# Patient Record
Sex: FEMALE | Race: WHITE | HISPANIC OR LATINO | Employment: FULL TIME | URBAN - METROPOLITAN AREA
[De-identification: names, ages, dates, MRNs, and addresses within clinical notes are randomized per-mention and may not be internally consistent; named-entity substitution may affect disease eponyms.]

---

## 2017-03-03 ENCOUNTER — HOSPITAL ENCOUNTER (EMERGENCY)
Facility: HOSPITAL | Age: 57
Discharge: HOME | End: 2017-03-04
Attending: EMERGENCY MEDICINE
Payer: MEDICAID

## 2017-03-03 DIAGNOSIS — F41.1 ANXIETY REACTION: Primary | ICD-10-CM

## 2017-03-03 DIAGNOSIS — R00.2 PALPITATION: ICD-10-CM

## 2017-03-03 LAB
BASOPHILS # BLD AUTO: 0.04 K/UL
BASOPHILS NFR BLD: 0.7 %
DIFFERENTIAL METHOD: NORMAL
EOSINOPHIL # BLD AUTO: 0.1 K/UL
EOSINOPHIL NFR BLD: 1.5 %
ERYTHROCYTE [DISTWIDTH] IN BLOOD BY AUTOMATED COUNT: 13.5 %
HCT VFR BLD AUTO: 38.3 %
HGB BLD-MCNC: 12.3 G/DL
LYMPHOCYTES # BLD AUTO: 2 K/UL
LYMPHOCYTES NFR BLD: 32.7 %
MCH RBC QN AUTO: 29.6 PG
MCHC RBC AUTO-ENTMCNC: 32.1 %
MCV RBC AUTO: 92 FL
MONOCYTES # BLD AUTO: 0.3 K/UL
MONOCYTES NFR BLD: 5.4 %
NEUTROPHILS # BLD AUTO: 3.5 K/UL
NEUTROPHILS NFR BLD: 59.4 %
PLATELET # BLD AUTO: 215 K/UL
PMV BLD AUTO: 10.2 FL
RBC # BLD AUTO: 4.15 M/UL
WBC # BLD AUTO: 5.96 K/UL

## 2017-03-03 PROCEDURE — 93005 ELECTROCARDIOGRAM TRACING: CPT

## 2017-03-03 PROCEDURE — 80053 COMPREHEN METABOLIC PANEL: CPT

## 2017-03-03 PROCEDURE — 83880 ASSAY OF NATRIURETIC PEPTIDE: CPT

## 2017-03-03 PROCEDURE — 85025 COMPLETE CBC W/AUTO DIFF WBC: CPT

## 2017-03-03 PROCEDURE — 99284 EMERGENCY DEPT VISIT MOD MDM: CPT

## 2017-03-03 PROCEDURE — 85379 FIBRIN DEGRADATION QUANT: CPT

## 2017-03-03 PROCEDURE — 85610 PROTHROMBIN TIME: CPT

## 2017-03-03 PROCEDURE — 84484 ASSAY OF TROPONIN QUANT: CPT

## 2017-03-03 PROCEDURE — 25000003 PHARM REV CODE 250: Performed by: EMERGENCY MEDICINE

## 2017-03-03 RX ORDER — ALPRAZOLAM 0.25 MG/1
0.25 TABLET ORAL
Status: COMPLETED | OUTPATIENT
Start: 2017-03-03 | End: 2017-03-03

## 2017-03-03 RX ORDER — TRAMADOL HYDROCHLORIDE 50 MG/1
50 TABLET ORAL EVERY 6 HOURS PRN
COMMUNITY

## 2017-03-03 RX ADMIN — ALPRAZOLAM 0.25 MG: 0.25 TABLET ORAL at 11:03

## 2017-03-03 NOTE — ED AVS SNAPSHOT
[facility]  [address] [facility]  [place] [address]               Stacey Kirit   3/3/2017 10:20 PM   ED    Descripción:  Female : 1960   Departamento:  [facility]           Valladares Cuidado fue coordinado por:     Provider Role From To    So Lucas MD Attending Provider [phone] --      Razón de la nicholas     Palpitations           Diagnósticos de Esta Visita        Comentarios    Anxiety reaction    -  Primario     Palpitation           ED Disposition     Ninguna           Lista de tareas           Información de seguimiento     Realice un seguimiento con:  [facility] [place]    Cuándo:  3/6/2017    Información de contacto:    [address] [facility]  [address]  [place] [address]  [phone]        [facility]     [facility] En [facility] [place] de Enfermeras - Asistencia   Enfermeras registradas de [facility] pueden ayudarle a reservar gena nicholas, proveer educación para la brown, asesoría clínica, y otros servicios de asesoramiento.   Llame para maribell servicio gratuito a [phone].             Medicamentos           These medications were administered today        Dose Freq    alprazolam tablet 0.25 mg 0.25 mg ED 1 Time    Sig: Take 1 tablet (0.25 mg total) by mouth ED 1 Time.    Categoría: Normal    Vía: Oral      DEJAR de juan estos medicamentos     promethazine (PHENERGAN) 25 MG tablet Take 1 tablet (25 mg total) by mouth every 6 (six) hours as needed (nausea or dizziness).    amoxicillin (AMOXIL) 500 MG capsule Take 500 mg by mouth 3 (three) times daily.    ibuprofen (ADVIL,MOTRIN) 200 MG tablet Take 200 mg by mouth every 6 (six) hours as needed for Pain.    loratadine (CLARITIN) 10 mg tablet Take 10 mg by mouth once daily.           Verifique que la siguiente lista de medicamentos es gena representación exacta de los medicamentos que está tomando actualmente. Si no hay ningunos reportados, la lista puede  "estar en alvarez. Si no es correcta, por favor póngase en contacto con lim proveedor de atención médica. Lleve esta lista con usted en zack de emergencia.           Medicamentos Actuales     tramadol (ULTRAM) 50 mg tablet Take 50 mg by mouth every 6 (six) hours as needed for Pain.           Información de referencia clínica           Michelle signos vitales stevo     PS Pulso Temperatura Resp Charlotte Peso    147/87 (BP Location: Left arm, Patient Position: Lying, BP Method: Automatic) 76 97.8 °F (36.6 °C) (Oral) 19 5' 4" (1.626 m) 62.1 kg (137 lb)    SpO2 BMI (IMC)                99% 23.52 kg/m2          Alergias     A partir del:  3/4/2017        No Known Allergies      Vacunas     Administradas en la fecha de la visita:  3/4/2017        None      ED Micro, Lab, POCT     Start Ordered       Status Ordering Provider    03/03/17 2332 03/03/17 2331  D dimer, quantitative  STAT      Final result     03/03/17 2323 03/03/17 2322  CBC auto differential  STAT      Final result     03/03/17 2323 03/03/17 2322  Comprehensive metabolic panel  STAT      Final result     03/03/17 2323 03/03/17 2322  Protime-INR  STAT      Final result     03/03/17 2323 03/03/17 2322  Troponin I  Now then every 3 hours     Comments:  PLEASE REVIEW ORDER START TIME BEFORE MARKING SPECIMEN COLLECTED.   Start Status   03/03/17 2323 Final result   03/04/17 0223 Final result       Acknowledged     03/03/17 2323 03/03/17 2322  B-Type natriuretic peptide (BNP)  STAT      Final result       ED Imaging Orders     Start Ordered       Status Ordering Provider    03/03/17 2323 03/03/17 2322  X-Ray Chest PA And Lateral  1 time imaging      Final result         Instrucciones a ankit de lorna           ¿Qué son las palpitaciones cardíacas?    Las palpitaciones cardíacas son un síntoma. Es la sensación que tiene cuando lim corazón parece latir muy rápido, muy tyshawn, saltearse un latido o aletear. Las palpitaciones cardíacas suelen sentirse en el pecho. Iris también pueden " sentirse en el aaliyah.  ¿Cuáles son las causas de las palpitaciones cardíacas?  En la mayoría de los casos, las palpitaciones cardíacas se deben a:  · Estrés o ansiedad  · Actividad física  · Embarazo  · Algunos medicamentos  · Cafeína  · Nicotina  · Bebidas alcohólicas  · Drogas ilegales, man la cocaína  · Problemas de brown, tales man anemia o gena tiroides hiperactiva  En algunos casos, las palpitaciones cardíacas pueden deberse a un problema del corazón. Los ritmos cardíacos anormales (arritmias) son la principal preocupación. Quizás sea necesario un manejo adecuado de las palpitaciones por parte de usted y lim proveedor de atención médica. O también puede necesitar tratamiento de inmediato.  ¿Cómo se tratan las palpitaciones cardíacas?  El tratamiento para las palpitaciones cardíacas depende de lim causa. Las opciones pueden incluir:  · Manejar las cosas que le desencadenan las palpitaciones cardíacas. Por ejemplo:  · Aprender maneras de reducir el estrés y la ansiedad  · Evitar la cafeína, la nicotina, las bebidas alcohólicas o las drogas ilegales  · Dejar de usar ciertos medicamentos, siguiendo las indicaciones de lim médico  · Recurrir a medicamentos, procedimientos o cirugía para tratar gena arritmia u otro problema de brown que le esté causando los síntomas  ¿Cuáles son las complicaciones de las palpitaciones cardíacas?  Las complicaciones de las palpitaciones cardíacas son raras a menos que se deban a un problema reyes man la arritmia. En tales casos, las complicaciones pueden incluir:  · Desmayo  · Insuficiencia cardíaca. Mary problema se da cuando el corazón está tan débil que ya no bombea ramonita la lele.  · Coágulos sanguíneos y ataque cerebral  · Paro cardíaco repentino. Mary problema sucede cuando el corazón gaetano de latir de repente.  ¿Cuándo terry llamar a mi proveedor de atención médica?  Llame a lim proveedor de atención médica de inmediato si nota alguno de los siguientes síntomas:  · Fiebre de  100.4º F (38º C) o superior, o según le indiquen  · Los síntomas no mejoran con el tratamiento, o empeoran  · Síntomas nuevos, man dolor en el pecho, falta de aire, mareo o desmayo   Date Last Reviewed: 5/1/2016  © 5591-7960 [employer]. [address] [place] [address], [place] [address]. All rights reserved. This information is not intended as a substitute for professional medical care. Always follow your healthcare professional's instructions.        Los trastornos de ansiedad  Marsha todo el anahi siente nerviosismo de vez en cuando. Es normal tener un nudo en el estómago antes de un examen, o el pulso acelerado la primera vez que ethan sale con alguien. Iris un trastorno de ansiedad es mucho más que un ataque de nervios; de hecho, kirstie síntomas pueden resultar abrumadores. Afortunadamente, muchos de estos síntomas pueden aliviarse con un tratamiento; empiece por hablar con lim médico.    ¿Qué son los trastornos de ansiedad?  Las personas con trastornos de ansiedad sienten pánico y miedo intensos. Estos sentimientos pueden surgir sin motivo aparente y tienden a recurrir gena y otra vez, al punto de interferir en la dorothy cotidiana y causar gran angustia. Lim temor podría inducirle a evitar cualquier factor que lo desencadena, en casos extremos, reyes vez usted deje de salir de lim casa. Los trastornos de ansiedad pueden producir otros síntomas, entre ellos:  · Pensamientos obsesivos que no se pueden controlar  · Pesadillas constantes o recuerdos dolorosos del pasado  · Náuseas, transpiración y tensión muscular  · Dificultad para dormir o para concentrarse  ¿Qué causa los trastornos de ansiedad?  Los trastornos de ansiedad tienden a afectar a varios miembros de la misma seema; en algunas personas podrían deberse a maltrato o negligencia en la infancia, mientras que en otras a eventos estresantes o traumáticos en lim dorothy. La ansiedad puede deteriorar la autoestima y las habilidades para enfrentarse a situaciones.  Para  mejorar  Raffy vez usted piense que nada puede ayudarlo o sandra lo que dirán los demás. Iris muchos de los síntomas de ansiedad pueden aliviarse. No hay por qué avergonzarse de tener un trastorno de ansiedad; la mayoría de las personas obtienen los mejores resultados con un tratamiento que combina medicamentos y terapia. Aunque no es gena lico, maribell tipo de tratamiento puede ayudarle a tener gena dorothy más berenice.  Trastornos de ansiedad frecuentes  · Trastorno de pánico. Causa un miedo intenso de encontrarse en peligro.  · Fobias. Miedos extremos a ciertos objetos, lugares o eventos.  · Trastorno obsesivo-compulsivo. Causa pensamientos indeseables y a veces, la necesidad de realizar ciertas acciones gena y otra vez.  · Trastorno por estrés postraumático. Sucede en personas que fagan sobrevivido experiencias terribles; puede causar pesadillas y flashbacks sobre el evento.  · Trastorno generalizado de ansiedad. Causa preocupaciones constantes que pueden interferir seriamente en la dorothy.   Date Last Reviewed: 2/11/2015 © 2000-2016 [employer]. [address] [place] [address]. Todos los derechos reservados. Esta información no pretende sustituir la atención médica profesional. Sólo lim médico puede diagnosticar y tratar un problema de brown.          Reacción Ante El Estrés [Stress Reaction]  La ansiedad (anxiety) es gena sensación que todos tenemos cuando creemos que algo teresa puede llegar a pasar. Es gena respuesta normal ante el estrés y, por lo general, sólo causa gena reacción leve. Cuando la ansiedad se vuelve más severa, es posible que las emociones interfieran con la dorothy diaria. En algunos casos, usted quizás no se dé cuenta de qué es lo que le provoca la ansiedad.  Gabby gena reacción de ansiedad, puede sentirse desesperanzado, nervioso, deprimido o irritable. Lim cuerpo puede mostrar signos de ansiedad de muchas maneras diferentes. Puede sentir la boca seca, temblores, mareos, debilidad, dificultad  para respirar, presión en el pecho, dolor de chelsi, náuseas, diarrea, cansancio, problemas sexuales o para dormir.  Cuidados En [place]:  1) Intente identificar qué cosas de lim dorothy le ocasionan estrés (stress). Quizás no senait obvias. Pueden ser, por ejemplo:  -- Complicaciones diarias que se van acumulando (embotellamientos, citas a las que no pudo asistir, problemas con el automóvil, etc.).  -- Cambios de dorothy importantes, tanto buenos (la llegada de un bebé, un ascenso en el trabajo) man malos (quedarse sin trabajo, perder a un ser querido).  -- Sobrecarga: la sensación de que usted tiene demasiadas responsabilidades y que no puede ocuparse de todas ellas al mismo tiempo.  -- Sentirse abrumado, la sensación de que kirstie problemas son mucho más de lo que usted puede llegar a resolver.  2) Observe cómo responde lim cuerpo al estrés. Aprenda a oír las señales que le da lim cuerpo. Ello puede ayudarle a actuar antes de que el estrés se vuelva grave.  3) Cuando le sea posible, intente hacer algo para eliminar la causa que le origina estrés. (Evite las complicaciones, limite la cantidad de cambios que suceden en lim dorothy al mismo tiempo y tómese un descanso cuando se sienta abrumado.)  4) Desafortunadamente, hay muchas situaciones estresantes que no se pueden evitar. Es necesario que aprenda a MANEJAR MEJOR EL ESTRÉS. Hay varios métodos comprobados que le permitirán reducir la ansiedad. Por ejemplo, cosas simples man hacer ejercicio, tener gena buena nutrición y el descanso adecuado. Asimismo, hay ciertas técnicas que resultan útiles: ejercicios de relajación y respiración, visualización, biofeedback y meditación. Para obtener más información sobre ello, consulte a lim médico o vaya a la librería de lim kalyan y sha los diferentes libros y cintas que hay disponibles sobre maribell sandra.  Visita De Control:  Si claudette que no logra reducir lim ansiedad con medidas de autoayuda, comuníquese con lim médico o programe gena nicholas con un  consejero.  Busque Prontamente Atención Médica  si algo de lo siguiente ocurre:  -- Los síntomas empeoran.  -- Dolor en el pecho o dificultades para respirar.  -- Dolor de chelsi tyshawn que no se massiel descansando y tomando algún calmante suave.  -- El corazón late rápido o de manera irregular. Desmayo.  Date Last Reviewed: 9/29/2015  © 7660-3471 [employer]. [address] [place] [address]. Todos los derechos reservados. Esta información no pretende sustituir la atención médica profesional. Sólo lim médico puede diagnosticar y tratar un problema de brown.          Registrarse para [facility]     La activación de lim cuenta [place] es tan fácil man 1-2-3!    1) Ir a my.[facility].[place], seleccione Registrarse Ahora, meter el código de activación y lim fecha de nacimiento, y seleccione Próximo.    [id number]  Expires: 4/18/2017  3:01 AM      2) Crear un nombre de usuario y contraseña para usar cuando se visita [facility] en el futuro y selecciona gena pregunta de seguridad en zack de que pierda lim contraseña y seleccione Próximo.    3) Introduzca lim dirección de correo electrónico y faiza ic en Registrarse!    Información Adicional  Si tiene alguna pregunta, por favor, e-mail [email or web address] o llame al [phone] para hablar con nuestro personal. Recuerde, [facility] no debe ser usada para necesidades urgentes. En zack de emergencia médica, llame al [phone].         [facility] cumple con las leyes federales aplicables de derechos civiles y no discrimina por motivos de ulysses, color, origen nacional, edad, discapacidad, o sexo.        Language Assistance Services     ATTENTION: Language assistance services are available, free of charge. Please call [phone].      ATENCIÓN: Si habla español, tiene a lim disposición servicios gratuitos de asistencia lingüística. Llame al [phone].     PORTIA Ý: N?u b?n nói Ti?ng Vi?t, có các d?ch v? h? tr? ngôn ng? mi?n phí darell cho  b?n. G?i s? [phone].                      [facility]  [address] [facility] [address] [facility]  [place] [address]               Stacey Kirit   3/3/2017 10:20 PM   ED    Description:  Female : 1960   Department:  [facility]           Your Care was Coordinated By:     Provider Role From To    So Lucas MD Attending Provider [phone] --      Reason for Visit     Palpitations           Diagnoses this Visit        Comments    Anxiety reaction    -  Primary     Palpitation           ED Disposition     None           To Do List           Follow-up Information     Follow up with [facility] [place] In 2 days.    Contact information:    [address] [facility]  [address]  [place] [address]  [phone]        [facility]     [facility] Care [facility] -  Assistance  Registered nurses in the [facility] provide clinical advisement, health education, appointment booking, and other advisory services.  Call for this free service at [phone].             Medications           These medications were administered today        Dose Freq    alprazolam tablet 0.25 mg 0.25 mg ED 1 Time    Sig: Take 1 tablet (0.25 mg total) by mouth ED 1 Time.    Class: Normal    Route: Oral      STOP taking these medications     promethazine (PHENERGAN) 25 MG tablet Take 1 tablet (25 mg total) by mouth every 6 (six) hours as needed (nausea or dizziness).    amoxicillin (AMOXIL) 500 MG capsule Take 500 mg by mouth 3 (three) times daily.    ibuprofen (ADVIL,MOTRIN) 200 MG tablet Take 200 mg by mouth every 6 (six) hours as needed for Pain.    loratadine (CLARITIN) 10 mg tablet Take 10 mg by mouth once daily.           Verify that the below list of medications is an accurate representation of the medications you are currently taking.  If none reported, the list may be blank. If incorrect, please contact your healthcare provider. Carry this list  "with you in case of emergency.           Current Medications     tramadol (ULTRAM) 50 mg tablet Take 50 mg by mouth every 6 (six) hours as needed for Pain.           Clinical Reference Information           Your Vitals Were     BP Pulse Temp Resp Height Weight    147/87 (BP Location: Left arm, Patient Position: Lying, BP Method: Automatic) 76 97.8 °F (36.6 °C) (Oral) 19 5' 4" (1.626 m) 62.1 kg (137 lb)    SpO2 BMI                99% 23.52 kg/m2          Allergies as of 3/4/2017     No Known Allergies      Immunizations Administered on Date of Encounter - 3/4/2017     None      ED Micro, Lab, POCT     Start Ordered       Status Ordering Provider    03/03/17 2332 03/03/17 2331  D dimer, quantitative  STAT      Final result     03/03/17 2323 03/03/17 2322  CBC auto differential  STAT      Final result     03/03/17 2323 03/03/17 2322  Comprehensive metabolic panel  STAT      Final result     03/03/17 2323 03/03/17 2322  Protime-INR  STAT      Final result     03/03/17 2323 03/03/17 2322  Troponin I  Now then every 3 hours     Comments:  PLEASE REVIEW ORDER START TIME BEFORE MARKING SPECIMEN COLLECTED.   Start Status   03/03/17 2323 Final result   03/04/17 0223 Final result       Acknowledged     03/03/17 2323 03/03/17 2322  B-Type natriuretic peptide (BNP)  STAT      Final result       ED Imaging Orders     Start Ordered       Status Ordering Provider    03/03/17 2323 03/03/17 2322  X-Ray Chest PA And Lateral  1 time imaging      Final result         Discharge Instructions           ¿Qué son las palpitaciones cardíacas?    Las palpitaciones cardíacas son un síntoma. Es la sensación que tiene cuando lim corazón parece latir muy rápido, muy tyshawn, saltearse un latido o aletear. Las palpitaciones cardíacas suelen sentirse en el pecho. Iris también pueden sentirse en el aaliyah.  ¿Cuáles son las causas de las palpitaciones cardíacas?  En la mayoría de los casos, las palpitaciones cardíacas se deben a:  · Estrés o " ansiedad  · Actividad física  · Embarazo  · Algunos medicamentos  · Cafeína  · Nicotina  · Bebidas alcohólicas  · Drogas ilegales, man la cocaína  · Problemas de brown, tales man anemia o gena tiroides hiperactiva  En algunos casos, las palpitaciones cardíacas pueden deberse a un problema del corazón. Los ritmos cardíacos anormales (arritmias) son la principal preocupación. Quizás sea necesario un manejo adecuado de las palpitaciones por parte de usted y lim proveedor de atención médica. O también puede necesitar tratamiento de inmediato.  ¿Cómo se tratan las palpitaciones cardíacas?  El tratamiento para las palpitaciones cardíacas depende de lim causa. Las opciones pueden incluir:  · Manejar las cosas que le desencadenan las palpitaciones cardíacas. Por ejemplo:  · Aprender maneras de reducir el estrés y la ansiedad  · Evitar la cafeína, la nicotina, las bebidas alcohólicas o las drogas ilegales  · Dejar de usar ciertos medicamentos, siguiendo las indicaciones de lim médico  · Recurrir a medicamentos, procedimientos o cirugía para tratar gena arritmia u otro problema de brown que le esté causando los síntomas  ¿Cuáles son las complicaciones de las palpitaciones cardíacas?  Las complicaciones de las palpitaciones cardíacas son raras a menos que se deban a un problema reyes man la arritmia. En tales casos, las complicaciones pueden incluir:  · Desmayo  · Insuficiencia cardíaca. Mary problema se da cuando el corazón está tan débil que ya no bombea ramonita la lele.  · Coágulos sanguíneos y ataque cerebral  · Paro cardíaco repentino. Mary problema sucede cuando el corazón gaetano de latir de repente.  ¿Cuándo terry llamar a mi proveedor de atención médica?  Llame a lim proveedor de atención médica de inmediato si nota alguno de los siguientes síntomas:  · Fiebre de 100.4º F (38º C) o superior, o según le indiquen  · Los síntomas no mejoran con el tratamiento, o empeoran  · Síntomas nuevos, man dolor en el pecho, falta de aire,  divya hanson   Date Last Reviewed: 5/1/2016  © 1213-2447 [employer]. [address] [place] [address], [place] [address]. All rights reserved. This information is not intended as a substitute for professional medical care. Always follow your healthcare professional's instructions.        Los trastornos de ansiedad  Marsha todo el anahi siente nerviosismo de vez en cuando. Es normal tener un nudo en el estómago antes de un examen, o el pulso acelerado la primera vez que ethan sale con alguien. Iris un trastorno de ansiedad es mucho más que un ataque de nervios; de hecho, kirstie síntomas pueden resultar abrumadores. Afortunadamente, muchos de estos síntomas pueden aliviarse con un tratamiento; empiece por hablar con lim médico.    ¿Qué son los trastornos de ansiedad?  Las personas con trastornos de ansiedad sienten pánico y miedo intensos. Estos sentimientos pueden surgir sin motivo aparente y tienden a recurrir gena y otra vez, al punto de interferir en la dorothy cotidiana y causar gran angustia. Lim temor podría inducirle a evitar cualquier factor que lo desencadena, en casos extremos, reyes vez usted deje de salir de lim casa. Los trastornos de ansiedad pueden producir otros síntomas, entre ellos:  · Pensamientos obsesivos que no se pueden controlar  · Pesadillas constantes o recuerdos dolorosos del pasado  · Náuseas, transpiración y tensión muscular  · Dificultad para dormir o para concentrarse  ¿Qué causa los trastornos de ansiedad?  Los trastornos de ansiedad tienden a afectar a varios miembros de la misma seema; en algunas personas podrían deberse a maltrato o negligencia en la infancia, mientras que en otras a eventos estresantes o traumáticos en lim dorothy. La ansiedad puede deteriorar la autoestima y las habilidades para enfrentarse a situaciones.  Para mejorar  Reyes vez usted piense que nada puede ayudarlo o sandra lo que dirán los demás. Iris muchos de los síntomas de ansiedad pueden aliviarse. No hay por qué  avergonzarse de tener un trastorno de ansiedad; la mayoría de las personas obtienen los mejores resultados con un tratamiento que combina medicamentos y terapia. Aunque no es gena lico, maribell tipo de tratamiento puede ayudarle a tener gena dorothy más berenice.  Trastornos de ansiedad frecuentes  · Trastorno de pánico. Causa un miedo intenso de encontrarse en peligro.  · Fobias. Miedos extremos a ciertos objetos, lugares o eventos.  · Trastorno obsesivo-compulsivo. Causa pensamientos indeseables y a veces, la necesidad de realizar ciertas acciones gena y otra vez.  · Trastorno por estrés postraumático. Sucede en personas que fagan sobrevivido experiencias terribles; puede causar pesadillas y flashbacks sobre el evento.  · Trastorno generalizado de ansiedad. Causa preocupaciones constantes que pueden interferir seriamente en la dorothy.   Date Last Reviewed: 2/11/2015 © 2000-2016 [employer]. [address] [place] [address]. Todos los derechos reservados. Esta información no pretende sustituir la atención médica profesional. Sólo lim médico puede diagnosticar y tratar un problema de brown.          Reacción Ante El Estrés [Stress Reaction]  La ansiedad (anxiety) es gena sensación que todos tenemos cuando creemos que algo teresa puede llegar a pasar. Es gena respuesta normal ante el estrés y, por lo general, sólo causa gena reacción leve. Cuando la ansiedad se vuelve más severa, es posible que las emociones interfieran con la dorothy diaria. En algunos casos, usted quizás no se dé cuenta de qué es lo que le provoca la ansiedad.  Gabby gena reacción de ansiedad, puede sentirse desesperanzado, nervioso, deprimido o irritable. Lim cuerpo puede mostrar signos de ansiedad de muchas maneras diferentes. Puede sentir la boca seca, temblores, mareos, debilidad, dificultad para respirar, presión en el pecho, dolor de chelsi, náuseas, diarrea, cansancio, problemas sexuales o para dormir.  Cuidados En [place]:  1) Intente  identificar qué cosas de lim dorothy le ocasionan estrés (stress). Quizás no senait obvias. Pueden ser, por ejemplo:  -- Complicaciones diarias que se van acumulando (embotellamientos, citas a las que no pudo asistir, problemas con el automóvil, etc.).  -- Cambios de dorothy importantes, tanto buenos (la llegada de un bebé, un ascenso en el trabajo) man malos (quedarse sin trabajo, perder a un ser querido).  -- Sobrecarga: la sensación de que usted tiene demasiadas responsabilidades y que no puede ocuparse de todas ellas al mismo tiempo.  -- Sentirse abrumado, la sensación de que kirstie problemas son mucho más de lo que usted puede llegar a resolver.  2) Observe cómo responde lim cuerpo al estrés. Aprenda a oír las señales que le da lim cuerpo. Ello puede ayudarle a actuar antes de que el estrés se vuelva grave.  3) Cuando le sea posible, intente hacer algo para eliminar la causa que le origina estrés. (Evite las complicaciones, limite la cantidad de cambios que suceden en lim dorothy al mismo tiempo y tómese un descanso cuando se sienta abrumado.)  4) Desafortunadamente, hay muchas situaciones estresantes que no se pueden evitar. Es necesario que aprenda a MANEJAR MEJOR EL ESTRÉS. Hay varios métodos comprobados que le permitirán reducir la ansiedad. Por ejemplo, cosas simples man hacer ejercicio, tener gena buena nutrición y el descanso adecuado. Asimismo, hay ciertas técnicas que resultan útiles: ejercicios de relajación y respiración, visualización, biofeedback y meditación. Para obtener más información sobre ello, consulte a lim médico o vaya a la librería de lim kalyan y sha los diferentes libros y cintas que hay disponibles sobre maribell sandra.  Visita De Control:  Si claudette que no logra reducir lim ansiedad con medidas de autoayuda, comuníquese con lim médico o programe gena nicholas con un consejero.  Busque Prontamente Atención Médica  si algo de lo siguiente ocurre:  -- Los síntomas empeoran.  -- Dolor en el pecho o dificultades para  respirar.  -- Dolor de chelsi tyshawn que no se massiel descansando y tomando algún calmante suave.  -- El corazón late rápido o de manera irregular. Mikeo.  Date Last Reviewed: 9/29/2015  © 9921-8732 [employer]. [address] [place] [address], [place] [address]. Todos los derechos reservados. Esta información no pretende sustituir la atención médica profesional. Sólo lim médico puede diagnosticar y tratar un problema de brown.          [facility] Sign-Up     Activating your [facility] account is as easy as 1-2-3!     1) Visit [email or web address], select Sign Up Now, enter this activation code and your date of birth, then select Next.  [email or web address]  Expires: 4/18/2017  3:01 AM      2) Create a username and password to use when you visit [facility] in the future and select a security question in case you lose your password and select Next.    3) Enter your e-mail address and click Sign Up!    Additional Information  If you have questions, please e-mail [email or web address] or call [phone] to talk to our [facility] staff. Remember, [facility] is NOT to be used for urgent needs. For medical emergencies, dial 911.          [facility] complies with applicable Federal civil rights laws and does not discriminate on the basis of race, color, national origin, age, disability, or sex.        Language Assistance Services     ATTENTION: Language assistance services are available, free of charge. Please call [phone].      ATENCIÓN: Si habla español, tiene a lim disposición servicios gratuitos de asistencia lingüística. Llame al [phone].     CHÚ Ý: N?u b?n nói Ti?ng Vi?t, có các d?ch v? h? tr? ngôn ng? mi?n phí dành cho b?n. G?i s? [phone].

## 2017-03-04 VITALS
HEIGHT: 64 IN | BODY MASS INDEX: 23.39 KG/M2 | DIASTOLIC BLOOD PRESSURE: 90 MMHG | WEIGHT: 137 LBS | RESPIRATION RATE: 19 BRPM | SYSTOLIC BLOOD PRESSURE: 148 MMHG | HEART RATE: 79 BPM | TEMPERATURE: 98 F | OXYGEN SATURATION: 98 %

## 2017-03-04 LAB
ALBUMIN SERPL BCP-MCNC: 4.6 G/DL
ALP SERPL-CCNC: 196 U/L
ALT SERPL W/O P-5'-P-CCNC: 118 U/L
ANION GAP SERPL CALC-SCNC: 15 MMOL/L
AST SERPL-CCNC: 58 U/L
BILIRUB SERPL-MCNC: 0.3 MG/DL
BNP SERPL-MCNC: 10 PG/ML
BUN SERPL-MCNC: 15 MG/DL
CALCIUM SERPL-MCNC: 9.9 MG/DL
CHLORIDE SERPL-SCNC: 104 MMOL/L
CO2 SERPL-SCNC: 22 MMOL/L
CREAT SERPL-MCNC: 0.8 MG/DL
D DIMER PPP IA.FEU-MCNC: 0.27 MG/L FEU
EST. GFR  (AFRICAN AMERICAN): >60 ML/MIN/1.73 M^2
EST. GFR  (NON AFRICAN AMERICAN): >60 ML/MIN/1.73 M^2
GLUCOSE SERPL-MCNC: 134 MG/DL
INR PPP: 1
POTASSIUM SERPL-SCNC: 3.7 MMOL/L
PROT SERPL-MCNC: 8.7 G/DL
PROTHROMBIN TIME: 10.6 SEC
SODIUM SERPL-SCNC: 141 MMOL/L
TROPONIN I SERPL DL<=0.01 NG/ML-MCNC: <0.006 NG/ML
TROPONIN I SERPL DL<=0.01 NG/ML-MCNC: <0.006 NG/ML

## 2017-03-04 PROCEDURE — 84484 ASSAY OF TROPONIN QUANT: CPT

## 2017-03-04 NOTE — ED PROVIDER NOTES
"Encounter Date: 3/3/2017       History     Chief Complaint   Patient presents with    Palpitations     palpitations and shortness of breath.  Patient became nervous after son and father got in an argument.     Review of patient's allergies indicates:  No Known Allergies  HPI Comments: 57 Y/O HF PRESENTS WITH C/O PALPITATIONS AND SOB APPROXIMATELY 1 HOUR PTA.  THE SYMPTOMS BEGAN WHEN THE PATIENT WITNESSED AN ARGUMENT BETWEEN TWO FAMILY MEMBERS AND "THERE WAS A LOT OF SCREAMING."  PT STATES THAT SHE HAS A PROBLEM WITH ANXIETY AND HAS HAD INCREASED AMOUNT OF NERVOUSNESS LATELY DUE TO STRESS.  PT DENIES ANY HX OF CARDIAC PROBLEMS IN THE PAST.  PT CURRENTLY DENIES CP OR SOB.  SHE REPORTS THAT SHE FEELS LIKE HER HEART IS BEATING FAST.  NO HX OF ARRHYTHMIA IN THE PAST.  NO FEVER/CHILLS.  NO COUGH OR WHEEZING.  NO N/V/ABD PAIN OR DIARRHEA.  NO RECENT ILLNESS.      PT REPORTS PMHX OF HLD.  NO DX OF HTN OR DM.  PT IS A NON-SMOKER.  PT MOTHER HAD AN MI AT THE AGE OF 61.  PT HAS NEVER HAD A STRESS TEST.  SHE IS FOLLOWED BY [facility] AND HER NEXT APPT IS IN April.      The history is provided by the patient and a relative. A  was used.     Past Medical History:   Diagnosis Date    Arthritis     Hyperlipidemia      History reviewed. No pertinent surgical history.  History reviewed. No pertinent family history.  Social History   Substance Use Topics    Smoking status: Never Smoker    Smokeless tobacco: Never Used    Alcohol use No     Review of Systems   Constitutional: Negative for chills, diaphoresis and fever.   HENT: Negative for congestion.    Eyes: Negative.    Respiratory: Positive for shortness of breath. Negative for cough, chest tightness and wheezing.    Cardiovascular: Positive for palpitations. Negative for chest pain and leg swelling.   Gastrointestinal: Negative for abdominal pain, diarrhea, nausea and vomiting.   Endocrine: Negative for polydipsia and polyphagia. "   Genitourinary: Negative for difficulty urinating and dysuria.   Musculoskeletal: Negative for back pain and neck pain.   Skin: Negative for pallor and rash.   Allergic/Immunologic: Negative for immunocompromised state.   Neurological: Negative for dizziness and headaches.   Hematological: Does not bruise/bleed easily.   Psychiatric/Behavioral: Negative for agitation and confusion. The patient is nervous/anxious.    All other systems reviewed and are negative.      Physical Exam   Initial Vitals   BP Pulse Resp Temp SpO2   03/03/17 2210 03/03/17 2210 03/03/17 2210 03/03/17 2210 03/03/17 2210   184/96 104 18 97.8 °F (36.6 °C) 98 %     Physical Exam    Nursing note and vitals reviewed.  Constitutional: She appears well-developed and well-nourished. She is not diaphoretic. No distress.   57 Y/O HF, ANXIOUS AND TALKING WITH FAMILY AT BEDSIDE.  NO DISTRESS.     HENT:   Head: Normocephalic and atraumatic.   Mouth/Throat: Oropharynx is clear and moist.   Eyes: Conjunctivae and EOM are normal. No scleral icterus.   Neck: Normal range of motion. Neck supple.   Cardiovascular: Normal rate, regular rhythm and normal heart sounds. Exam reveals no gallop and no friction rub.    No murmur heard.  Pulmonary/Chest: Breath sounds normal. No respiratory distress. She has no wheezes. She has no rhonchi. She has no rales. She exhibits no tenderness.   Abdominal: Soft. Bowel sounds are normal. She exhibits no distension. There is no tenderness. There is no rebound and no guarding.   Musculoskeletal: Normal range of motion. She exhibits no edema.   Lymphadenopathy:     She has no cervical adenopathy.   Neurological: She is alert and oriented to person, place, and time.   Skin: Skin is warm and dry. No erythema.   Psychiatric: Judgment and thought content normal.   ANXIOUS         ED Course   Procedures  Labs Reviewed - No data to display  EKG Readings: (Independently Interpreted)   Initial Reading: No STEMI. Previous EKG: Compared with  most recent EKG Previous EKG Date: 11/02/14. Heart Rate: 93.       X-Rays:   Independently Interpreted Readings:   Chest X-Ray: Normal heart size.  No infiltrates.  No acute abnormalities.     Medical Decision Making:   Initial Assessment:   55 Y/O HF PRESENTS WITH SOB AND PALPITATIONS THAT BEGAN PTA WHEN SHE WITNESSED AN ARGUMENT BETWEEN FAMILY MEMBERS.  NO CHEST PAIN OR PRESSURE.  PT REPORTS HER SYMPTOMS ARE IMPROVING.  NO SOB CURRENTLY.  NO HX OF HEART DISEASE.    Differential Diagnosis:   DDX: ACS, STEMI, ANXIETY REACTION, METABOLIC DERANGEMENT, ARRHYTHMIA  Independently Interpreted Test(s):   I have ordered and independently interpreted X-rays - see prior notes.  I have ordered and independently interpreted EKG Reading(s) - see prior notes  Clinical Tests:   Lab Tests: Ordered and Reviewed  The following lab test(s) were unremarkable: CBC, CMP, Troponin, D-Dimer and BNP  Radiological Study: Ordered and Reviewed  Medical Tests: Ordered and Reviewed  ED Management:   PT REPORTS FEELING MUCH BETTER AFTER XANAX.  SHE HAS NOT HAD ANY REPEAT EPISODES OF PALPITATIONS OR SOB AND REPORTS SHE FEELS CALM.    PT WORK UP IN ED IS NEG.  SHE IS WELL APPEARING AND WANTS TO GO HOME. I FEEL HER SYMPTOMS ARE MORE CONSISTENT WITH ANXIETY REACTION THAN CARDIAC IN NATURE.  I HAVE DISCUSSED THE NEED FOR A BASELINE STRESS TEST WITH THE PATIENT AND HER DAUGHTER.  THEY BOTH UNDERSTAND AND WILL HAVE HER F/U WITH PCP ON Monday TO SCHEDULE STRESS TEST.  PT AND HER DAUGHTER ALSO UNDERSTAND THAT THE PATIENT MUST RETURN TO ED WITH ANY WORSENING OF CONDITION.      Pt counseled on their diagnosis and the importance of following up with PCP.  Pt also cautioned on when to return to ED.  Pt verbalizes understanding of discharge plan and will return to ED immediately if symptoms worsen                     ED Course     Clinical Impression:   The primary encounter diagnosis was Anxiety reaction. A diagnosis of Palpitation was also pertinent to this  visit.    Disposition:   Disposition: Discharged  Condition: Stable       So Lucas MD  03/04/17 0537

## 2017-03-04 NOTE — ED NOTES
Pt. appears calm, no longer tearful or tremors, denies palpitations. Denies dizziness or lightheadedness. Denies CP or chest tightness at present time. Denies SOB. AAox4. NAD.

## 2017-03-04 NOTE — ED NOTES
"Pt. ambulatory to Ed room with steady gait.  at bedside for translation. Daughter and grandson at bedside. Pt. appears anxious, tearful, bilateral hand tremors, reports "heart racing" and chest tightness. "felt like she was going to pass out". Palpitations. Onset of symptoms began after argument at home between spouse and son. Pt states she does feels safe to go home. Denies SOB. Denies BUE numbness or tingling. AAOx4.  "

## 2017-03-04 NOTE — ED NOTES
at bedside for translation. Explained medication indications, contraindications, AE, SE, reactions and expected outcomes. Pt verbalizes understanding. Agrees to treatment plan. Denies allergy.

## 2017-03-04 NOTE — DISCHARGE INSTRUCTIONS
¿Qué son las palpitaciones cardíacas?    Las palpitaciones cardíacas son un síntoma. Es la sensación que tiene cuando lim corazón parece latir muy rápido, muy tyshawn, saltearse un latido o aletear. Las palpitaciones cardíacas suelen sentirse en el pecho. Iris también pueden sentirse en el aaliyah.  ¿Cuáles son las causas de las palpitaciones cardíacas?  En la mayoría de los casos, las palpitaciones cardíacas se deben a:  · Estrés o ansiedad  · Actividad física  · Embarazo  · Algunos medicamentos  · Cafeína  · Nicotina  · Bebidas alcohólicas  · Drogas ilegales, man la cocaína  · Problemas de brown, tales man anemia o gena tiroides hiperactiva  En algunos casos, las palpitaciones cardíacas pueden deberse a un problema del corazón. Los ritmos cardíacos anormales (arritmias) son la principal preocupación. Quizás sea necesario un manejo adecuado de las palpitaciones por parte de usted y lim proveedor de atención médica. O también puede necesitar tratamiento de inmediato.  ¿Cómo se tratan las palpitaciones cardíacas?  El tratamiento para las palpitaciones cardíacas depende de lim causa. Las opciones pueden incluir:  · Manejar las cosas que le desencadenan las palpitaciones cardíacas. Por ejemplo:  · Aprender maneras de reducir el estrés y la ansiedad  · Evitar la cafeína, la nicotina, las bebidas alcohólicas o las drogas ilegales  · Dejar de usar ciertos medicamentos, siguiendo las indicaciones de lim médico  · Recurrir a medicamentos, procedimientos o cirugía para tratar gena arritmia u otro problema de brown que le esté causando los síntomas  ¿Cuáles son las complicaciones de las palpitaciones cardíacas?  Las complicaciones de las palpitaciones cardíacas son raras a menos que se deban a un problema reyes man la arritmia. En tales casos, las complicaciones pueden incluir:  · Desmayo  · Insuficiencia cardíaca. Mary problema se da cuando el corazón está tan débil que ya no bombea ramonita la lele.  · Coágulos sanguíneos y  ataque cerebral  · Paro cardíaco repentino. Mary problema sucede cuando el corazón gaetano de latir de repente.  ¿Cuándo terry llamar a mi proveedor de atención médica?  Llame a lim proveedor de atención médica de inmediato si nota alguno de los siguientes síntomas:  · Fiebre de 100.4º F (38º C) o superior, o según le indiquen  · Los síntomas no mejoran con el tratamiento, o empeoran  · Síntomas nuevos, man dolor en el pecho, falta de aire, mareo o desmayo   Date Last Reviewed: 5/1/2016  © 8650-6025 [employer] [address] [place] [address]. All rights reserved. This information is not intended as a substitute for professional medical care. Always follow your healthcare professional's instructions.        Los trastornos de ansiedad  Marsha todo el anahi siente nerviosismo de vez en cuando. Es normal tener un nudo en el estómago antes de un examen, o el pulso acelerado la primera vez que ethan sale con alguien. Iris un trastorno de ansiedad es mucho más que un ataque de nervios; de hecho, kirstie síntomas pueden resultar abrumadores. Afortunadamente, muchos de estos síntomas pueden aliviarse con un tratamiento; empiece por hablar con lim médico.    ¿Qué son los trastornos de ansiedad?  Las personas con trastornos de ansiedad sienten pánico y miedo intensos. Estos sentimientos pueden surgir sin motivo aparente y tienden a recurrir gena y otra vez, al punto de interferir en la dorothy cotidiana y causar gran angustia. Lim temor podría inducirle a evitar cualquier factor que lo desencadena, en casos extremos, reyes vez usted deje de salir de lim casa. Los trastornos de ansiedad pueden producir otros síntomas, entre ellos:  · Pensamientos obsesivos que no se pueden controlar  · Pesadillas constantes o recuerdos dolorosos del pasado  · Náuseas, transpiración y tensión muscular  · Dificultad para dormir o para concentrarse  ¿Qué causa los trastornos de ansiedad?  Los trastornos de ansiedad tienden a afectar a varios  miembros de la misma seema; en algunas personas podrían deberse a maltrato o negligencia en la infancia, mientras que en otras a eventos estresantes o traumáticos en lim dorohty. La ansiedad puede deteriorar la autoestima y las habilidades para enfrentarse a situaciones.  Para mejorar  Raffy vez usted piense que nada puede ayudarlo o sandra lo que dirán los demás. Iris muchos de los síntomas de ansiedad pueden aliviarse. No hay por qué avergonzarse de tener un trastorno de ansiedad; la mayoría de las personas obtienen los mejores resultados con un tratamiento que combina medicamentos y terapia. Aunque no es gena lico, maribell tipo de tratamiento puede ayudarle a tener gena dorothy más berenice.  Trastornos de ansiedad frecuentes  · Trastorno de pánico. Causa un miedo intenso de encontrarse en peligro.  · Fobias. Miedos extremos a ciertos objetos, lugares o eventos.  · Trastorno obsesivo-compulsivo. Causa pensamientos indeseables y a veces, la necesidad de realizar ciertas acciones gena y otra vez.  · Trastorno por estrés postraumático. Sucede en personas que fagan sobrevivido experiencias terribles; puede causar pesadillas y flashbacks sobre el evento.  · Trastorno generalizado de ansiedad. Causa preocupaciones constantes que pueden interferir seriamente en la dorothy.   Date Last Reviewed: 2/11/2015  © 2693-3875 [employer]. [address] [place] [address], [place] [address]. Todos los derechos reservados. Esta información no pretende sustituir la atención médica profesional. Sólo lim médico puede diagnosticar y tratar un problema de brown.          Reacción Ante El Estrés [Stress Reaction]  La ansiedad (anxiety) es gena sensación que todos tenemos cuando creemos que algo teresa puede llegar a pasar. Es gena respuesta normal ante el estrés y, por lo general, sólo causa gena reacción leve. Cuando la ansiedad se vuelve más severa, es posible que las emociones interfieran con la dorothy diaria. En algunos casos, usted quizás no se dé cuenta de  qué es lo que le provoca la ansiedad.  Gabby gena reacción de ansiedad, puede sentirse desesperanzado, nervioso, deprimido o irritable. Lim cuerpo puede mostrar signos de ansiedad de muchas maneras diferentes. Puede sentir la boca seca, temblores, mareos, debilidad, dificultad para respirar, presión en el pecho, dolor de chelsi, náuseas, diarrea, cansancio, problemas sexuales o para dormir.  Cuidados En [place]:  1) Intente identificar qué cosas de lim dorothy le ocasionan estrés (stress). Quizás no senait obvias. Pueden ser, por ejemplo:  -- Complicaciones diarias que se van acumulando (embotellamientos, citas a las que no pudo asistir, problemas con el automóvil, etc.).  -- Cambios de dorothy importantes, tanto buenos (la llegada de un bebé, un ascenso en el trabajo) man malos (quedarse sin trabajo, perder a un ser querido).  -- Sobrecarga: la sensación de que usted tiene demasiadas responsabilidades y que no puede ocuparse de todas ellas al mismo tiempo.  -- Sentirse abrumado, la sensación de que kirstie problemas son mucho más de lo que usted puede llegar a resolver.  2) Observe cómo responde lim cuerpo al estrés. Aprenda a oír las señales que le da lim cuerpo. Ello puede ayudarle a actuar antes de que el estrés se vuelva grave.  3) Cuando le sea posible, intente hacer algo para eliminar la causa que le origina estrés. (Evite las complicaciones, limite la cantidad de cambios que suceden en lim dorothy al mismo tiempo y tómese un descanso cuando se sienta abrumado.)  4) Desafortunadamente, hay muchas situaciones estresantes que no se pueden evitar. Es necesario que aprenda a MANEJAR MEJOR EL ESTRÉS. Hay varios métodos comprobados que le permitirán reducir la ansiedad. Por ejemplo, cosas simples man hacer ejercicio, tener gena buena nutrición y el descanso adecuado. Asimismo, hay ciertas técnicas que resultan útiles: ejercicios de relajación y respiración, visualización, biofeedback y meditación. Para obtener más información sobre  ello, consulte a lim médico o vaya a la librería de lim kaylan y sha los diferentes libros y cintas que hay disponibles sobre maribell sandra.  Visita De Control:  Si claudette que no logra reducir lim ansiedad con medidas de autoayuda, comuníquese con lim médico o programe gena nicholas con un consejero.  Busque Prontamente Atención Médica  si algo de lo siguiente ocurre:  -- Los síntomas empeoran.  -- Dolor en el pecho o dificultades para respirar.  -- Dolor de chelsi tyshawn que no se massiel descansando y tomando algún calmante suave.  -- El corazón late rápido o de manera irregular. Wallace.  Date Last Reviewed: 9/29/2015  © 9301-6431 [employer] [email or web address]. [address] [place] [address], [place] [address]. Todos los derechos reservados. Esta información no pretende sustituir la atención médica profesional. Sólo lim médico puede diagnosticar y tratar un problema de brown.

## 2019-06-20 DIAGNOSIS — Z01.818 PREOP EXAMINATION: Primary | ICD-10-CM

## 2019-06-24 ENCOUNTER — ANESTHESIA EVENT (OUTPATIENT)
Dept: SURGERY | Facility: HOSPITAL | Age: 59
End: 2019-06-24
Payer: MEDICAID

## 2019-06-24 ENCOUNTER — HOSPITAL ENCOUNTER (OUTPATIENT)
Dept: CARDIOLOGY | Facility: HOSPITAL | Age: 59
Discharge: HOME | End: 2019-06-24
Attending: ORTHOPAEDIC SURGERY
Payer: MEDICAID

## 2019-06-24 DIAGNOSIS — Z01.818 PREOP EXAMINATION: ICD-10-CM

## 2019-06-24 PROCEDURE — 93005 ELECTROCARDIOGRAM TRACING: CPT

## 2019-06-25 ENCOUNTER — HOSPITAL ENCOUNTER (OUTPATIENT)
Facility: HOSPITAL | Age: 59
Discharge: HOME | End: 2019-06-25
Attending: ORTHOPAEDIC SURGERY | Admitting: ORTHOPAEDIC SURGERY
Payer: MEDICAID

## 2019-06-25 ENCOUNTER — ANESTHESIA (OUTPATIENT)
Dept: SURGERY | Facility: HOSPITAL | Age: 59
End: 2019-06-25
Payer: MEDICAID

## 2019-06-25 VITALS
OXYGEN SATURATION: 97 % | RESPIRATION RATE: 20 BRPM | HEIGHT: 62 IN | DIASTOLIC BLOOD PRESSURE: 67 MMHG | SYSTOLIC BLOOD PRESSURE: 129 MMHG | TEMPERATURE: 98 F | HEART RATE: 91 BPM | BODY MASS INDEX: 25.03 KG/M2 | WEIGHT: 136 LBS

## 2019-06-25 DIAGNOSIS — S83.207A TEAR OF LEFT MENISCUS AS CURRENT INJURY, INITIAL ENCOUNTER: Primary | ICD-10-CM

## 2019-06-25 DIAGNOSIS — Z01.818 PRE-OP TESTING: ICD-10-CM

## 2019-06-25 LAB
ANION GAP SERPL CALC-SCNC: 9 MMOL/L (ref 8–16)
BASOPHILS # BLD AUTO: 0.03 K/UL (ref 0–0.2)
BASOPHILS NFR BLD: 0.3 % (ref 0–1.9)
BUN SERPL-MCNC: 14 MG/DL (ref 6–20)
CALCIUM SERPL-MCNC: 9.9 MG/DL (ref 8.7–10.5)
CHLORIDE SERPL-SCNC: 104 MMOL/L (ref 95–110)
CO2 SERPL-SCNC: 25 MMOL/L (ref 23–29)
CREAT SERPL-MCNC: 0.8 MG/DL (ref 0.5–1.4)
DIFFERENTIAL METHOD: NORMAL
EOSINOPHIL # BLD AUTO: 0.1 K/UL (ref 0–0.5)
EOSINOPHIL NFR BLD: 1.5 % (ref 0–8)
ERYTHROCYTE [DISTWIDTH] IN BLOOD BY AUTOMATED COUNT: 13.8 % (ref 11.5–14.5)
EST. GFR  (AFRICAN AMERICAN): >60 ML/MIN/1.73 M^2
EST. GFR  (NON AFRICAN AMERICAN): >60 ML/MIN/1.73 M^2
GLUCOSE SERPL-MCNC: 90 MG/DL (ref 70–110)
HCT VFR BLD AUTO: 39.9 % (ref 37–48.5)
HGB BLD-MCNC: 13 G/DL (ref 12–16)
LYMPHOCYTES # BLD AUTO: 3 K/UL (ref 1–4.8)
LYMPHOCYTES NFR BLD: 31.2 % (ref 18–48)
MCH RBC QN AUTO: 29.7 PG (ref 27–31)
MCHC RBC AUTO-ENTMCNC: 32.6 G/DL (ref 32–36)
MCV RBC AUTO: 91 FL (ref 82–98)
MONOCYTES # BLD AUTO: 0.7 K/UL (ref 0.3–1)
MONOCYTES NFR BLD: 7.7 % (ref 4–15)
NEUTROPHILS # BLD AUTO: 5.6 K/UL (ref 1.8–7.7)
NEUTROPHILS NFR BLD: 59.3 % (ref 38–73)
PLATELET # BLD AUTO: 249 K/UL (ref 150–350)
PMV BLD AUTO: 9.9 FL (ref 9.2–12.9)
POTASSIUM SERPL-SCNC: 3.7 MMOL/L (ref 3.5–5.1)
RBC # BLD AUTO: 4.37 M/UL (ref 4–5.4)
SODIUM SERPL-SCNC: 138 MMOL/L (ref 136–145)
WBC # BLD AUTO: 9.48 K/UL (ref 3.9–12.7)

## 2019-06-25 PROCEDURE — 63600175 PHARM REV CODE 636 W HCPCS: Performed by: ANESTHESIOLOGY

## 2019-06-25 PROCEDURE — 01400 ANES OPN/ARTHRS KNEE JT NOS: CPT | Performed by: ORTHOPAEDIC SURGERY

## 2019-06-25 PROCEDURE — 36000710: Performed by: ORTHOPAEDIC SURGERY

## 2019-06-25 PROCEDURE — 97161 PT EVAL LOW COMPLEX 20 MIN: CPT

## 2019-06-25 PROCEDURE — 27201423 OPTIME MED/SURG SUP & DEVICES STERILE SUPPLY: Performed by: ORTHOPAEDIC SURGERY

## 2019-06-25 PROCEDURE — 63600175 PHARM REV CODE 636 W HCPCS: Performed by: ORTHOPAEDIC SURGERY

## 2019-06-25 PROCEDURE — 25000003 PHARM REV CODE 250: Performed by: NURSE ANESTHETIST, CERTIFIED REGISTERED

## 2019-06-25 PROCEDURE — 63600175 PHARM REV CODE 636 W HCPCS: Performed by: NURSE ANESTHETIST, CERTIFIED REGISTERED

## 2019-06-25 PROCEDURE — 37000008 HC ANESTHESIA 1ST 15 MINUTES: Performed by: ORTHOPAEDIC SURGERY

## 2019-06-25 PROCEDURE — 71000039 HC RECOVERY, EACH ADD'L HOUR: Performed by: ORTHOPAEDIC SURGERY

## 2019-06-25 PROCEDURE — 71000015 HC POSTOP RECOV 1ST HR: Performed by: ORTHOPAEDIC SURGERY

## 2019-06-25 PROCEDURE — 25000003 PHARM REV CODE 250: Performed by: ORTHOPAEDIC SURGERY

## 2019-06-25 PROCEDURE — 80048 BASIC METABOLIC PNL TOTAL CA: CPT

## 2019-06-25 PROCEDURE — 71000016 HC POSTOP RECOV ADDL HR: Performed by: ORTHOPAEDIC SURGERY

## 2019-06-25 PROCEDURE — 85025 COMPLETE CBC W/AUTO DIFF WBC: CPT

## 2019-06-25 PROCEDURE — 36415 COLL VENOUS BLD VENIPUNCTURE: CPT

## 2019-06-25 PROCEDURE — 25000003 PHARM REV CODE 250: Performed by: ANESTHESIOLOGY

## 2019-06-25 PROCEDURE — 71000033 HC RECOVERY, INTIAL HOUR: Performed by: ORTHOPAEDIC SURGERY

## 2019-06-25 PROCEDURE — C1713 ANCHOR/SCREW BN/BN,TIS/BN: HCPCS | Performed by: ORTHOPAEDIC SURGERY

## 2019-06-25 PROCEDURE — 37000009 HC ANESTHESIA EA ADD 15 MINS: Performed by: ORTHOPAEDIC SURGERY

## 2019-06-25 PROCEDURE — 36000711: Performed by: ORTHOPAEDIC SURGERY

## 2019-06-25 DEVICE — CARTRIDGE NOVOSTITCH PLUS 2-0: Type: IMPLANTABLE DEVICE | Site: KNEE | Status: FUNCTIONAL

## 2019-06-25 DEVICE — SYS NOVOSTITCH PRO MENIS 2-0: Type: IMPLANTABLE DEVICE | Site: KNEE | Status: FUNCTIONAL

## 2019-06-25 RX ORDER — LIDOCAINE HCL/PF 100 MG/5ML
SYRINGE (ML) INTRAVENOUS
Status: DISCONTINUED | OUTPATIENT
Start: 2019-06-25 | End: 2019-06-25

## 2019-06-25 RX ORDER — EPINEPHRINE 1 MG/ML
INJECTION, SOLUTION INTRACARDIAC; INTRAMUSCULAR; INTRAVENOUS; SUBCUTANEOUS
Status: DISCONTINUED | OUTPATIENT
Start: 2019-06-25 | End: 2019-06-25 | Stop reason: HOSPADM

## 2019-06-25 RX ORDER — OXYCODONE AND ACETAMINOPHEN 5; 325 MG/1; MG/1
1 TABLET ORAL EVERY 6 HOURS PRN
Qty: 28 TABLET | Refills: 0 | Status: SHIPPED | OUTPATIENT
Start: 2019-06-25

## 2019-06-25 RX ORDER — DICLOFENAC SODIUM 75 MG/1
75 TABLET, DELAYED RELEASE ORAL 2 TIMES DAILY
COMMUNITY

## 2019-06-25 RX ORDER — ROCURONIUM BROMIDE 10 MG/ML
INJECTION, SOLUTION INTRAVENOUS
Status: DISCONTINUED | OUTPATIENT
Start: 2019-06-25 | End: 2019-06-25

## 2019-06-25 RX ORDER — EPHEDRINE SULFATE 50 MG/ML
INJECTION, SOLUTION INTRAVENOUS
Status: DISCONTINUED | OUTPATIENT
Start: 2019-06-25 | End: 2019-06-25

## 2019-06-25 RX ORDER — ONDANSETRON 2 MG/ML
INJECTION INTRAMUSCULAR; INTRAVENOUS
Status: DISCONTINUED | OUTPATIENT
Start: 2019-06-25 | End: 2019-06-25

## 2019-06-25 RX ORDER — HYDROMORPHONE HYDROCHLORIDE 2 MG/ML
0.25 INJECTION, SOLUTION INTRAMUSCULAR; INTRAVENOUS; SUBCUTANEOUS EVERY 5 MIN PRN
Status: DISCONTINUED | OUTPATIENT
Start: 2019-06-25 | End: 2019-06-25 | Stop reason: HOSPADM

## 2019-06-25 RX ORDER — LEVOTHYROXINE SODIUM 100 UG/1
100 TABLET ORAL DAILY
COMMUNITY

## 2019-06-25 RX ORDER — SODIUM CHLORIDE 0.9 % (FLUSH) 0.9 %
3 SYRINGE (ML) INJECTION
Status: DISCONTINUED | OUTPATIENT
Start: 2019-06-25 | End: 2019-06-25 | Stop reason: HOSPADM

## 2019-06-25 RX ORDER — SODIUM CHLORIDE, SODIUM LACTATE, POTASSIUM CHLORIDE, CALCIUM CHLORIDE 600; 310; 30; 20 MG/100ML; MG/100ML; MG/100ML; MG/100ML
INJECTION, SOLUTION INTRAVENOUS CONTINUOUS
Status: DISCONTINUED | OUTPATIENT
Start: 2019-06-25 | End: 2019-06-25 | Stop reason: HOSPADM

## 2019-06-25 RX ORDER — PROPOFOL 10 MG/ML
VIAL (ML) INTRAVENOUS
Status: DISCONTINUED | OUTPATIENT
Start: 2019-06-25 | End: 2019-06-25

## 2019-06-25 RX ORDER — FENTANYL CITRATE 50 UG/ML
INJECTION, SOLUTION INTRAMUSCULAR; INTRAVENOUS
Status: DISCONTINUED | OUTPATIENT
Start: 2019-06-25 | End: 2019-06-25

## 2019-06-25 RX ORDER — CEFAZOLIN SODIUM 2 G/50ML
2 SOLUTION INTRAVENOUS
Status: DISCONTINUED | OUTPATIENT
Start: 2019-06-25 | End: 2019-06-25 | Stop reason: HOSPADM

## 2019-06-25 RX ORDER — ACETAMINOPHEN 10 MG/ML
1000 INJECTION, SOLUTION INTRAVENOUS ONCE
Status: COMPLETED | OUTPATIENT
Start: 2019-06-25 | End: 2019-06-25

## 2019-06-25 RX ORDER — MIDAZOLAM HYDROCHLORIDE 1 MG/ML
INJECTION, SOLUTION INTRAMUSCULAR; INTRAVENOUS
Status: DISCONTINUED | OUTPATIENT
Start: 2019-06-25 | End: 2019-06-25

## 2019-06-25 RX ORDER — SODIUM CHLORIDE 0.9 % (FLUSH) 0.9 %
3 SYRINGE (ML) INJECTION EVERY 8 HOURS
Status: DISCONTINUED | OUTPATIENT
Start: 2019-06-25 | End: 2019-06-25 | Stop reason: HOSPADM

## 2019-06-25 RX ORDER — SUCCINYLCHOLINE CHLORIDE 20 MG/ML
INJECTION INTRAMUSCULAR; INTRAVENOUS
Status: DISCONTINUED | OUTPATIENT
Start: 2019-06-25 | End: 2019-06-25

## 2019-06-25 RX ORDER — HYDROMORPHONE HYDROCHLORIDE 2 MG/ML
0.5 INJECTION, SOLUTION INTRAMUSCULAR; INTRAVENOUS; SUBCUTANEOUS EVERY 5 MIN PRN
Status: COMPLETED | OUTPATIENT
Start: 2019-06-25 | End: 2019-06-25

## 2019-06-25 RX ORDER — ONDANSETRON 2 MG/ML
4 INJECTION INTRAMUSCULAR; INTRAVENOUS DAILY PRN
Status: DISCONTINUED | OUTPATIENT
Start: 2019-06-25 | End: 2019-06-25 | Stop reason: HOSPADM

## 2019-06-25 RX ORDER — OXYCODONE AND ACETAMINOPHEN 5; 325 MG/1; MG/1
1 TABLET ORAL ONCE
Status: COMPLETED | OUTPATIENT
Start: 2019-06-25 | End: 2019-06-25

## 2019-06-25 RX ADMIN — ONDANSETRON 4 MG: 2 INJECTION, SOLUTION INTRAMUSCULAR; INTRAVENOUS at 07:06

## 2019-06-25 RX ADMIN — ACETAMINOPHEN 1000 MG: 10 INJECTION, SOLUTION INTRAVENOUS at 09:06

## 2019-06-25 RX ADMIN — PROMETHAZINE HYDROCHLORIDE 12.5 MG: 25 INJECTION INTRAMUSCULAR; INTRAVENOUS at 09:06

## 2019-06-25 RX ADMIN — HYDROMORPHONE HYDROCHLORIDE 0.5 MG: 2 INJECTION, SOLUTION INTRAMUSCULAR; INTRAVENOUS; SUBCUTANEOUS at 09:06

## 2019-06-25 RX ADMIN — SODIUM CHLORIDE, SODIUM LACTATE, POTASSIUM CHLORIDE, AND CALCIUM CHLORIDE: .6; .31; .03; .02 INJECTION, SOLUTION INTRAVENOUS at 07:06

## 2019-06-25 RX ADMIN — HYDROMORPHONE HYDROCHLORIDE 0.25 MG: 2 INJECTION, SOLUTION INTRAMUSCULAR; INTRAVENOUS; SUBCUTANEOUS at 09:06

## 2019-06-25 RX ADMIN — PROPOFOL 100 MG: 10 INJECTION, EMULSION INTRAVENOUS at 07:06

## 2019-06-25 RX ADMIN — CEFAZOLIN SODIUM 2 G: 2 SOLUTION INTRAVENOUS at 07:06

## 2019-06-25 RX ADMIN — SUCCINYLCHOLINE CHLORIDE 100 MG: 20 INJECTION, SOLUTION INTRAMUSCULAR; INTRAVENOUS at 07:06

## 2019-06-25 RX ADMIN — FENTANYL CITRATE 150 MCG: 50 INJECTION, SOLUTION INTRAMUSCULAR; INTRAVENOUS at 07:06

## 2019-06-25 RX ADMIN — EPHEDRINE SULFATE 25 MG: 50 INJECTION, SOLUTION INTRAMUSCULAR; INTRAVENOUS; SUBCUTANEOUS at 07:06

## 2019-06-25 RX ADMIN — FENTANYL CITRATE 50 MCG: 50 INJECTION, SOLUTION INTRAMUSCULAR; INTRAVENOUS at 07:06

## 2019-06-25 RX ADMIN — FENTANYL CITRATE 50 MCG: 50 INJECTION, SOLUTION INTRAMUSCULAR; INTRAVENOUS at 09:06

## 2019-06-25 RX ADMIN — HYDROMORPHONE HYDROCHLORIDE 0.25 MG: 2 INJECTION, SOLUTION INTRAMUSCULAR; INTRAVENOUS; SUBCUTANEOUS at 10:06

## 2019-06-25 RX ADMIN — MIDAZOLAM 2 MG: 1 INJECTION INTRAMUSCULAR; INTRAVENOUS at 07:06

## 2019-06-25 RX ADMIN — OXYCODONE HYDROCHLORIDE AND ACETAMINOPHEN 1 TABLET: 5; 325 TABLET ORAL at 11:06

## 2019-06-25 RX ADMIN — LIDOCAINE HYDROCHLORIDE 50 MG: 20 INJECTION, SOLUTION INTRAVENOUS at 07:06

## 2019-06-25 RX ADMIN — ROCURONIUM BROMIDE 10 MG: 10 INJECTION, SOLUTION INTRAVENOUS at 07:06

## 2019-06-25 NOTE — ANESTHESIA PREPROCEDURE EVALUATION
06/25/2019  Stacey Beth is a 58 y.o., female.    Anesthesia Evaluation      I have reviewed the Medications.     Review of Systems  Anesthesia Hx:  No problems with previous Anesthesia Denies Hx of Anesthetic complications History of prior surgery of interest to airway management or planning: Previous anesthesia: General Denies Family Hx of Anesthesia complications.   Denies Personal Hx of Anesthesia complications.   Social:  Non-Smoker, No Alcohol Use    Hematology/Oncology:  Hematology Normal   Oncology Normal     EENT/Dental:EENT/Dental Normal   Cardiovascular:   hyperlipidemia Pt with one time episode of palpitations   Pulmonary:  Pulmonary Normal    Renal/:  Renal/ Normal     Hepatic/GI:  Hepatic/GI Normal    Musculoskeletal:  Musculoskeletal Normal    Neurological:  Neurology Normal    Endocrine:   Hypothyroidism    Dermatological:  Skin Normal    Psych:  Psychiatric Normal           Physical Exam  General:  Well nourished    Airway/Jaw/Neck:  Airway Findings: Mouth Opening: Normal Tongue: Normal  General Airway Assessment: Adult  Mallampati: II      Dental:  Dental Findings: Upper partial dentures   Chest/Lungs:  Chest/Lungs Findings: Clear to auscultation, Normal Respiratory Rate     Heart/Vascular:  Heart Findings: Rate: Normal  Rhythm: Regular Rhythm        Mental Status:  Mental Status Findings:  Cooperative, Alert and Oriented         Anesthesia Plan  Type of Anesthesia, risks & benefits discussed:  Anesthesia Type:  general  Patient's Preference: general  Intra-op Monitoring Plan:   Intra-op Monitoring Plan Comments:   Post Op Pain Control Plan:   Post Op Pain Control Plan Comments:   Induction:   IV  Beta Blocker:  Patient is not currently on a Beta-Blocker (No further documentation required).       Informed Consent: Patient understands risks and agrees with Anesthesia plan.  Questions  answered. Anesthesia consent signed with patient.  ASA Score: 2     Day of Surgery Review of History & Physical: I have interviewed and examined the patient. I have reviewed the patient's H&P dated:            Ready For Surgery From Anesthesia Perspective.

## 2019-06-25 NOTE — OP NOTE
DATE OF PROCEDURE:  06/25/2019     FACILITY:  [facility] in [place].     SURGEON:  Lionel Moreno M.D.     FIRST ASSISTANT:  Enrrique Mancai M.D. (RES)     PREOPERATIVE DIAGNOSIS:  1. Left knee degenerative medial meniscus tear with horizontal cleavage component  2. Left knee medial femoral condyle chondromalacia   3. Left knee medial parameniscal cyst     POSTOPERATIVE DIAGNOSIS:    1. Left knee degenerative medial meniscus tear  2. Left knee medial femoral condyle chondromalacia   3. Left knee medial parameniscal cyst    PROCEDURES:    1. Left knee diagnostic arthroscopy  2. Left knee medial meniscus repair  3. Left knee medial femoral condyle chondroplasty  4. Left knee medial parameniscal cyst debridement      NARRATIVE:  The patient was first correctly identified in the preoperative   holding area.  Written informed consent was obtained.  The correct extremity was   marked with a surgical pen.  The patient was brought into the Operating Room.    The patient was placed supine on the Operating Room table.  General anesthesia   was administered.  A tourniquet was placed on the right thigh.  The right thigh   was secured in an arthroscopic leg jose.  The left knee was prepped and   draped in the usual sterile fashion using ChloraPrep solution.  A surgical   time-out was performed to verify the correct extremity and preoperative   administration of IV antibiotics within one hour of surgical start time.        A standard anterolateral portal was made.  The   arthroscope was introduced into the patellofemoral compartment.  There was an   area of grade 1 chondromalacia involving the medial facet of the patella.    The trochlear groove demonstrated grade 1 chondromalacia diffusely with an area of grade 3 chondromalacia.  The scope was   placed down the lateral gutter which showed no loose bodies.  The   scope was then placed down the medial gutter where no loose bodies were seen.    The scope was then placed  into the medial compartment.  Using an outside-in   technique, a direct anterior medial portal was made.  On examination of the   medial compartment, There was a degenerative meniscus tear of the posterior horn.  The chondral surfaces in the medial compartment   were found to have grade 3 chondromalacia on both the femoral and tibial surfaces with a flap on the medial femoral condyle with subchondral bone exposed.  The scope was then placed into the intercondylar   notch.  The ACL and the PCL were both probed and found to be intact.  The scope   was then placed in the lateral compartment.  A discoid meniscus was noted. The lateral femoral condyle had grade 1 chondromalacia. We turned back to the medial meniscus tear. The discision was made to debride this. We used a shaver and biter to debride the complex tear of the posterior horn. We inserted the camera into the lateral portal to visualize the medial meniscus root and it was in tact. After debriding the meniscus, it was left with a horizontal cleavage component that gave us access to the paramenisal cyst. We used a probe to debride the cyst. Cyst fluid was visualized. The decision was made to hay bail the horizontal cleavage tear to provide circumferential compression. We used a ceterix to pass suture around the posterior horn of the meniscus. We then tied the suture to hay bail this in a circumferential fashion. Two stitches were placed and the horizontal cleavage tear was closed. The meniscus was probed and stable  The chondral surfaces of both the medial femoral condyle was lightly shaved with the shaver to debride the unstable cartilaginous flap. The instruments were then removed from the knee   joint.  The portal incisions were closed with 3-0 nylon suture.    A sterile dressing was applied.  The patient was awakened and   transferred to the Postanesthesia Care Unit in a stable condition.     ESTIMATED BLOOD LOSS:  Less than 50 mL.     COMPLICATIONS:  None  known.      DRAINS:  None.    POSTOP: patient will be WBAT in a soft dressing. She will come to clinic in 2 weeks for a wound check.

## 2019-06-25 NOTE — DISCHARGE SUMMARY
[facility]  Discharge Note    SUMMARY     Admit Date: 6/25/2019    Discharge Date and Time:  06/25/2019 9:16 AM    Hospital Course (synopsis of major diagnoses, care, treatment, and services provided during the course of the hospital stay): Patient presented to [facility] on day of scheduled surgery and underwent left knee arthroscopy, please see operative report for further detail. Patient did well postoperatively and was found to be fit for discharge on POD# 0. Discharged home.       Final Diagnosis: Post-Op Diagnosis Codes:     * Tear of meniscus of left knee [S83.207A]    Disposition: Home or Self Care    Follow Up/Patient Instructions:   Patient can be WBAT    Medications:  Reconciled Home Medications:      Medication List      START taking these medications    oxyCODONE-acetaminophen 5-325 mg per tablet  Commonly known as:  PERCOCET  Take 1 tablet by mouth every 6 (six) hours as needed for Pain.        CONTINUE taking these medications    diclofenac 75 MG EC tablet  Commonly known as:  VOLTAREN  Take 75 mg by mouth 2 (two) times daily.     levothyroxine 100 MCG tablet  Commonly known as:  SYNTHROID  Take 100 mcg by mouth once daily.     traMADol 50 mg tablet  Commonly known as:  ULTRAM  Take 50 mg by mouth every 6 (six) hours as needed for Pain.          Discharge Procedure Orders   Diet Adult Regular     Notify your health care provider if you experience any of the following:  redness, tenderness, or signs of infection (pain, swelling, redness, odor or green/yellow discharge around incision site)     Leave dressing on - Keep it clean, dry, and intact until clinic visit     Activity as tolerated   Order Comments: With ROM 0-90     Follow-up Information     Lionel Moreno MD In 2 weeks.    Specialty:  Orthopedic Surgery  Contact information:  [address]  [address]  [place] [id number]  [phone]

## 2019-06-25 NOTE — PLAN OF CARE
Discharge criteria met,voicing desire to go home. Discharge instructions given to patient & family; verbalized understanding. Discharge home via wheelchair in care of .

## 2019-06-25 NOTE — H&P
Reason For Visit    Left knee pain     History of Present Illness    The patient is a 58 year old female who presents with left knee pain. She speaks limited English; daughter is present in room and interpreting. Pain has been present for six month. The pain is mostly medial. She works cleaning an office building. She denies any known injury. Pain worse with prolonged weightbearing activities. Pain improved some with rest, Tramadol, and Diclofenac.     Allergies   · No Known Environmental Allergies   · No Known Food Allergies   · No Known Allergies    Current Meds    Medication Name Instruction   Atorvastatin Calcium 20 MG Oral Tablet    Diclofenac Sodium 75 MG Oral Tablet Delayed Release    Levothroid 50 MCG TABS    traMADol HCl - 50 MG Oral Tablet      Active Problems   · Acute pain of left knee (M25.562)   · Tear of meniscus of left knee as current injury, initial encounter (S83.207A)    Past Medical History   · History of thyroid disorder (Z86.39)   · History of No pertinent past surgical history (Z78.9)    Family History   · No pertinent family history    Social History   · Never a smoker   · No alcohol use    Review of Systems    Review of systems have been reviewed and noted on the intake form dated June 13, 2019     Results/Data    X-rays and MRI reviewed. Horizontal cleavage tear medial meniscus with parameniscal cyst, discoid lateral meniscus.     Vitals   Recorded: 19Jun2019 09:54AM   Height 5 ft 2 in   Weight 134 lb 4 oz   BMI Calculated 24.55   BSA Calculated 1.61   Systolic 131   Diastolic 74   Heart Rate 80   Pain Scale 2     Physical Exam    General:  Alert, NAD. Oriented x 3    Skin:  No rash or cellulitis LLE    Lymphatics:  No lymphedema LLE    Left knee exam:  No deformity.   No effusion.   TTP medial joint line. Painful McMurrays.   ROM 0-140.   Pain with hyperflexion. Positive McMurrays.   Stable to varus/valgus stress. Negtaive Lachmans and posterior drawer.     Gross neuro exam intact  LLE    BCR toes left foot     Assessment   1. Tear of meniscus of left knee as current injury, initial encounter (S86.094A)    Procedure Note    PROCEDURE NOTE    PRE-PROCEDURE DIAGNOSIS:  Left knee medial meniscus tear    INDICATION:  Improve pain    POST-PROCEDURE DIAGNOSIS:  Left knee medial meniscus tear    PROCEDURE:  Left intra-articular corticosteroid injection    The correct extremity was identified. The risks, benefits, and alternatives were discussed. The patient gave consent to proceed with the injection. The knee was prepped in the usual sterile manner. Using an anterior approach, a mixture containing 4cc of 1% lidocaine, 5 cc marcaine, and 1 mg of Kenalog (40mg/cc) was injected into the knee joint. The patient tolerated the procedure without any known complication.     Plan    The patient came in today thinking that she was going to have surgery in the office today. This is obviously not feasible. She is visibly upset because she took off work. I apologized for whatever miscommunication may have occurred prior to her first visit with me. She states that she cannot do the surgery at a date in the near future. I have offered to give her a cortisone injection and she has expressed that she would like to proceed.     Interval changes to report in the history and physical exam: patient has decided to have surgery since her original consultation visit with me

## 2019-06-25 NOTE — TRANSFER OF CARE
"Anesthesia Transfer of Care Note    Patient: Stacey Beth    Procedure(s) Performed: Procedure(s) (LRB):  REPAIR, MENISCUS, KNEE GETA (Left)    Patient location: PACU    Anesthesia Type: general    Transport from OR: Transported from OR on 6-10 L/min O2 by face mask with adequate spontaneous ventilation    Post pain: adequate analgesia    Post assessment: no apparent anesthetic complications and tolerated procedure well    Post vital signs: stable    Level of consciousness: sedated    Nausea/Vomiting: no nausea/vomiting    Complications: none    Transfer of care protocol was followed      Last vitals:   Visit Vitals  /78 (BP Location: Right arm, Patient Position: Lying)   Pulse 81   Temp 36.3 °C (97.3 °F) (Temporal)   Resp 16   Ht 5' 2" (1.575 m)   Wt 61.7 kg (136 lb)   LMP 06/25/2012 (Approximate)   SpO2 100%   BMI 24.87 kg/m²     "

## 2019-06-25 NOTE — PT/OT/SLP PROGRESS
Physical Therapy Crutch Evaluation/Training  Discharge  Stacey Beth   MRN: [id number]   Admitting Diagnosis:The primary encounter diagnosis was Tear of left meniscus as current injury, initial encounter. A diagnosis of Pre-op testing was also pertinent to this visit.                          Billable Minutes: 1240 to 1300  Evaluation 20 min     Order: Gait training WBAT LLE  Order Date: 6/25/2019    Precautions Weight Bearing Status: weight bearing as tolerated: left leg    Patient Active Problem List   Diagnosis    Tear of left meniscus as current injury     Past Medical History:   Diagnosis Date    Arthritis     Hyperlipidemia     Hypothyroid      History reviewed. No pertinent surgical history.    Subjective Information     Prior level of function: independent  Residence: lives with their family 1-story house/ trailer   Support available: family  Equipment owned: None  Mental Status: Oriented X 4  Skin: Surgical bandage intact and dry LLE  Sensation: Intact  Posture: Good  Hearing: Intact  Vision:  Intact    Pain at time of assessment: 7/10 located in left leg, aggravated by activity, relieved by rest.    Objective findings/Assessment  Bed mobility: SBA  Transfers: SBA  Gross assessment: WFL  Standing balance: fair + with AD  ROM: WFL except L knee limited active and passive  Strength: WFL except L LE fair to poor + control with gait and transitional movements    Patient requires crutch fitting and training.    Treatment  Gait: Instructed in WBAT gait ~ 25 ft with axillary crutches SBA to CG  Stairs: ascended and descended step with axillary and CG assist  Transfers: Sit <> Stand SBA  Therapeutic Exercise: na  Education provided in form of: stair climbing    AM-PAC 6 CLICK MOBILITY  How much help from another person does this patient currently need?   1 = Unable, Total/Dependent Assistance  2 = A lot, Maximum/Moderate Assistance  3 = A little, Minimum/Contact Guard/Supervision  4 = None, Modified  [place]/Independent          AM-PAC Raw Score CMS G-Code Modifier Level of Impairment Assistance   6 % Total / Unable   7 - 9 CM 80 - 100% Maximal Assist   10 - 14 CL 60 - 80% Moderate Assist   15 - 19 CK 40 - 60% Moderate Assist   20 - 22 CJ 20 - 40% Minimal Assist   23 CI 1-20% SBA / CGA   24 CH 0% Independent/ Mod I     Goals/Discharge Status  Patient safely and effectively ambulates with crutches with  contact guard,  weight bearing as tolerated: left arm on level surfaces.    Recommended Plan:  Patient to be discharged to home with family  support.    Jimbo Serrato, PT  06/25/2019

## 2019-06-25 NOTE — DISCHARGE INSTRUCTIONS
Instrucciones de lorna para la artroscopia de rodilla  A usted le fagan hecho gena artroscopia de rodilla. En maribell procedimiento quirúrgico se practican pequeñas incisiones para ubicar, identificar y tratar problemas en la rodilla. Estos problemas incluyen la presencia de fragmentos sueltos, espolones óseos, osteocondritis disecante y sinovitis. Los siguientes consejos le ayudarán a recuperarse más rápidamente de la cirugía.  Actividad  · No maneje un automóvil hasta que lim médico le diga que puede hacerlo. [place]unca maneje mientras esté tomando medicamentos contra el dolor que contengan narcóticos.  · [place] los medicamentos contra el dolor exactamente man le indiquen. No espere hasta que el dolor se agrave antes de tomarlos y no lyle alcohol mientras los esté tomando.  · Siga las instrucciones de lim médico acerca del peso que puede soportar en la pierna. Es posible que el médico le ordene que use muletas para evitar el peso sobre la rodilla afectada.  · A menos que el médico le indique lo contrario, comience a usar la rodilla afectada tanto man pueda tolerarlo gena vez que hayan transcurrido 3 días desde la operación.  · Doble lentamente y enderece la pierna afectada tanto man pueda, a menos que el médico le indique lo contrario. Emily maribell ejercicio varias veces al día.  · Repose la rodilla acostándose y manteniéndola apoyada sobre almohadas emiliano los primeros 3 freda después de la cirugía. Mantenga el tobillo elevado por encima del nivel del corazón. [place]ingo le ayudará a controlar la hinchazón.  · Siga las instrucciones de lim médico acerca del cuidado de la abrazadera, inmovilizador o vendaje elástico que esté usando.  · Flexione el pie y gire el tobillo tanto man le sea posible emiliano las primeras semanas después de la cirugía. Asimismo, mueva los dedos del pie tanto man pueda.  Cuidado de la incisión  · Revise diariamente la incisión para phillip si tiene enrojecimiento, sensibilidad o secreción.  · No se alarme si  observa un poco de amoratamiento o hinchazón de la rodilla, o si hay gena pequeña cantidad de lele en la venda.  · Ajuste la venda o la abrazadera según lo necesite para que dé soporte a la rodilla, shaneka sin que quede demasiado apretada.  · No sumerja la herida quirúrgica en agua (jacuzzis, bañeras, albercas, piscinas, etc.) hasta que el médico le diga que puede hacerlo.  · No se duche hasta que hayan transcurrido 2 días desde la operación. A partir de entonces, dúchese según lo necesite. Cubra la rodilla con un plástico para evitar que se moje la venda o la abrazadera. Gena vez que le hayan quitado la venda, siga las instrucciones de lim médico para cuidar la herida. Y siéntese en un banco de ducha para prevenir las caídas mientras se ducha.  · Use gena bolsa de hielo o de chícharos congelados, o algo similar envuelto en un paño schneider para reducir la hinchazón. Mantenga el pie elevado mientras aplica hielo a la rodilla. Aplique el hielo emiliano 20 minutos y luego quítelo emiliano otros 20 minutos. Repita esto las veces que sea necesario. El hielo ayuda a reducir la hinchazón.  Otras precauciones  · Organice lim casa de reyes manera que las cosas que necesita usar estén a la mano.  · Quite las alfombrillas, los cables eléctricos y cualquier otra cosa que pueda hacerle caer.  · En el cuarto de baño, use alfombras antideslizantes, agarraderas, un inodoro elevado y gena silla o banco en la ducha.  · Use un bastón, muletas, gena andadera, pasamanos o barandas hasta que haya mike lim equilibrio, flexibilidad y fuerza, y pueda poner peso en la pierna. Y recuerde pedir ayuda a los demás cuando la necesite.  · Use gena mochila, un delantal o bolsillos para llevar kirstie cosas, a fin de mantener las kaiden libres para el equilibrio.  Visitas de control  Programe gena visita de control según le indique el personal médico.  Cuándo debe obtener atención médica  Llame al 911 de inmediatosi tiene cualquiera de estos síntomas:  · Dolor en  el pecho  · Falta de aliento (dificultad para respirar)  · Náuseas muy caitlin  En otros casos, llame a lim médico de inmediato si nota que tiene cualquiera de estos síntomas:  · Dolor que no se massiel con los medicamentos o el descanso  · Sangrado continuo a través del vendaje  · Hormigueo, adormecimiento o frío en el pie o en la pierna  · Fiebre de más de 100.4°F o escalofríos temblorosos  · Hinchazón excesiva, aumento del enrojecimiento o cualquier tipo de secreción alrededor de la incisión  · Hinchazón, sensibilidad o dolor en la pierna   Date Last Reviewed: 11/16/2015  © [id number] [employer]. [address] [place] [id number]. Todos los derechos reservados. Esta información no pretende sustituir la atención médica profesional. Sólo lim médico puede diagnosticar y tratar un problema de brown.    Dieta:Resuma lim dieta normal, a menos que tenga nausea : tome bastante liquidos y coma comida blandas.    Actividades:  Si usted recibio sedantes o anestesia, usted se sentira con sueno por varias horas.Gradualmente resuma kirstie actividades normales.    Medicinas: Medicamento para dolor tomeselo cuando lo necesite y a man se lo recetaron. Si le recetaron antibioticos,tomese todo el medicamento.    No maneje lim automovil, tome productos alcoholicos,o firme documentos legales por las siguiente 24 horas o mientra maribell tomando medicamento para dolor.    LLAME A LIM MEDICO:    Si comienza a sangrar ( CIERTA LELE SECA ES NORMAL).      Si la herida esta clemente,inflamada,le empieza a supurar, o fiebre mas de101.   Si comienza a toser,escupir lele,dolor de pecho. . Si la piernas de abajo de la rodillas le duelen, se sienten caliente, o se le inflaman .   Si tiene dolor o nausea/vomito persistente.    SI USTED TIENE CUALQUIER PROBLEMA O DIFFICULTADES MEDICA, POR FAVOR LLAME A LIM MEDICO. SI USTED NO SE COMUNICA CON LIM MEDICO SABRINA SIGE TENIENDO SIMPTOMAS QUE REQUERIEN ASSISTENCIA MEDICA, POR FAVOR REGRESE AL  DEPARTAMENTO DE EMERGENCIA MAS CERCANO.      Oxycodone tablets or capsules  ¿Qué es maribell medicamento?  La OXICODONA es un analgésico. Se utiliza para tratar los raghavendra moderados a severos.  ¿Cómo terry utilizar maribell medicamento?  [place] maribell medicamento por vía oral con un vaso de agua. Siga las instrucciones de la etiqueta del medicamento. Usted puede tomarlo con o sin alimentos. Si el medicamento le produce malestar estomacal, tómelo con alimentos. [place] kirstie dosis a intervalos regulares. No tome lim medicamento con gena frecuencia mayor a la indicada. No deje de tomarlo excepto si así lo indica lim médico.   Algunas marcas de maribell medicamento, reyes man Oxecta, tienen instrucciones especiales. Pregunte a lim médico o lmi profesional de la brown si estas instrucciones se aplican a usted: No nely, triture ni mástique maribell medicamento. Trague solamente gena tableta a la vez. No mojar, empapar o lamer la tableta antes de tomarla.   Hable con lim pediatra para informarse acerca del uso de maribell medicamento en niños. Puede requerir atención especial.  ¿Qué efectos secundarios puedo tener al utilizar maribell medicamento?  Efectos secundarios que debe informar a lim médico o a lim profesional de la brown tan pronto man sea posible:  · reacciones alérgicas man erupción cutánea, picazón o urticarias, hinchazón de la princess, labios o lengua  · problemas respiratorios  · confusión  · sensación de desmayos o aturdimiento, caídas  · dificultad para orinar o cambios en el volumen de orina  · cansancio o debilidad inusual  Efectos secundarios que, por lo general, no requieren atención médica (debe informarlos a lim médico o a lim profesional de la brown si persisten o si son molestos):  · estreñimiento  · boca seca  · picazón  · náuseas, vómitos  · malestar estomacal  ¿Qué puede interactuar con maribell medicamento?  · alcohol  · antihistamínicos  · ciertos medicamentos usados para náuseas, man clorpromacina,  droperidol  · eritromicina  · quetoconazol  · medicamentos para la depresión, ansiedad o trastornos psicóticos  · medicamentos para conciliar el sueño  · relajantes musculares  · naloxona  · naltrexona  · medicamentos narcóticos (opiáceos) para el dolor  · nilotinib  · fenobarbital  · fenitoína  · rifampicina  · ritonavir  · voriconazol  ¿Qué sucede si me olvido de gena dosis?  Si olvida gena dosis, tómela lo antes posible. Si es deann la hora de la próxima dosis, tome sólo shoshana dosis. No tome dosis adicionales o dobles.  ¿Dónde terry guardar mi medicina?  [place] fuera del alcance de los niños. Maribell medicamento puede ser abusado. Mantenga lim medicamento en un lugar seguro para protegerlo contra robos. No comparta maribell medicamento con nadie. Es peligroso  o regalar maribell medicamento y está prohibido por la jose antonio.  Guárdelo a temperatura ambiente, entre 15 y 30 grados C (59 y 86 grados F). Protéjalo yuli. Mantenga el envase ramonita cerrado.  Maribell medicamento puede causar sobredosis accidental y muerte si otros adultos, niños o mascotas se lo sandhya. Tire por el inodoro cualquier medicamento que no haya utilizado para reducir la posibilidad de daño. No utilice maribell medicamento después de la fecha de vencimiento.  ¿Qué le terry informar a mi profesional de la brown antes de juan maribell medicamento?  Necesitan saber si usted presenta alguno de los siguientes problemas o situaciones:  enfermedad de [place] tumor cerebral lesión de la chelsi enfermedad cardiaca antecedentes de abuso de alcohol o drogas si evie alcohol con frecuencia enfermedad renal enfermedad hepática enfermedad pulmonar o respiratoria, man asma trastorno mental enfermedad pancreática convulsiones enfermedad tiroidea gena reacción inusual o alérgica a la oxicodona, codeína, hidrocodona, morfina, a otros medicamentos, alimentos, colorantes o conservantes si está embarazada o buscando quedar embarazada si está amamantando a un bebé  ¿A qué terry estar  atento al usar maribell medicamento?  Si el dolor no desaparece, si empeora o si experimenta un dolor nuevo o de tipo diferente, consulte a lim médico o a lim profesional de la brown. Usted puede desarrollar tolerancia al medicamento. La tolerancia significa que necesitará gena dosis más lorna para aliviar el dolor. Tolerancia es normal y esperada cuando esté tomando maribell medicamento por un brain período de tiempo.  No suspenda el uso de lim medicamento repentinamente debido a que puede desarrollar gena reacción severa. Lim cuerpo se acostumbra a maribell medicamento. [place] NO significa que sea adicto. La adicción es un comportamiento que hace referencia a la obtención y utilización de un medicamento con fines que no son médicos. Si tiene dolor, existe gena razón médica para que usted tome un analgésico. Lim médico le indicará la cantidad de medicamento que necesitará jerson. Si lim médico desea que pare el tratamiento, la dosis será reducida gradualmente para evitar efectos secundarios.  Puede experimentar somnolencia o mareos al comenzar con el medicamento o al cambiar de dosis. No conduzca ni utilice maquinaria ni faiza nada que sea peligroso hasta que sepa cómo le afecta maribell medicamento. Siéntese y póngase de pie lentamente.  Hay distintos tipos de medicamentos narcóticos (opiáceos) para el dolor. Si usted isabel más que un tipo a la misma vez, podrá tener más efectos secundarios. Stephan a lim proveedor de atención medica gena lista de todos los medicamentos que usted usa. Lim médico le informará la cantidad de medicamento que necesita jerson. No tome más medicamento que lo indicado. Comuníquese con emergencia para ayuda si tiene problemas para respirar.  Maribell medicamento causará estreñimiento. Trate de evacuar los intestinos al menos cada 2 ó 3 días. Si no evacua los intestinos emiliano 3 días, comuníquese con lim médico o con lim profesional de la brown.  Se le podrá secar la boca. Jerson agua en abundancia, masticar chicle sin azúcar y  herrera chiuos le ayudarán. Visite a lim dentista cada 6 meses.  © 0048-6892 [employer]. [address] [place] [address], [place] [address]. Todos los derechos reservados. Esta información no pretende sustituir la atención médica profesional. Sólo lim médico puede diagnosticar y tratar un problema de brown.

## 2019-06-25 NOTE — PLAN OF CARE
Patient has met PACU discharge criteria, VSS, pain well controlled. Family updated by phone. Released from PACU by Dr. Scooter Hale*

## 2019-06-25 NOTE — ANESTHESIA POSTPROCEDURE EVALUATION
Anesthesia Post Evaluation    Patient: Stacey Beth    Procedure(s) Performed: Procedure(s) (LRB):  REPAIR, MENISCUS, KNEE GETA (Left)    Final Anesthesia Type: general  Patient location during evaluation: PACU  Patient participation: Yes- Able to Participate  Level of consciousness: awake and alert  Post-procedure vital signs: reviewed and stable  Pain management: adequate  Airway patency: patent  PONV status at discharge: No PONV  Anesthetic complications: no      Cardiovascular status: blood pressure returned to baseline  Respiratory status: unassisted, spontaneous ventilation and room air  Hydration status: euvolemic  Follow-up not needed.          Vitals Value Taken Time   /70 6/25/2019 10:15 AM   Temp 36.3 °C (97.3 °F) 6/25/2019  9:17 AM   Pulse 84 6/25/2019 10:18 AM   Resp 13 6/25/2019 10:18 AM   SpO2 95 % 6/25/2019 10:18 AM   Vitals shown include unvalidated device data.      No case tracking events are documented in the log.      Pain/Vadim Score: Pain Rating Prior to Med Admin: 6 (6/25/2019  9:50 AM)  Pain Rating Post Med Admin: 4 (6/25/2019 10:10 AM)  Vadim Score: 9 (6/25/2019 10:10 AM)

## 2019-09-04 DIAGNOSIS — S83.207A: Primary | ICD-10-CM

## 2019-09-27 ENCOUNTER — CLINICAL SUPPORT (OUTPATIENT)
Dept: REHABILITATION | Facility: HOSPITAL | Age: 59
End: 2019-09-27
Attending: ORTHOPAEDIC SURGERY
Payer: MEDICAID

## 2019-09-27 DIAGNOSIS — M25.562 ACUTE PAIN OF BOTH KNEES: ICD-10-CM

## 2019-09-27 DIAGNOSIS — R26.89 IMPAIRED GAIT AND MOBILITY: ICD-10-CM

## 2019-09-27 DIAGNOSIS — M25.662 DECREASED RANGE OF MOTION (ROM) OF LEFT KNEE: ICD-10-CM

## 2019-09-27 DIAGNOSIS — M25.561 ACUTE PAIN OF BOTH KNEES: ICD-10-CM

## 2019-09-27 DIAGNOSIS — R29.898 DECREASED STRENGTH INVOLVING KNEE JOINT: ICD-10-CM

## 2019-09-27 DIAGNOSIS — M25.661 DECREASED RANGE OF MOTION (ROM) OF RIGHT KNEE: ICD-10-CM

## 2019-09-27 PROCEDURE — 97161 PT EVAL LOW COMPLEX 20 MIN: CPT | Mod: PN

## 2019-09-27 PROCEDURE — 97110 THERAPEUTIC EXERCISES: CPT | Mod: PN

## 2019-09-27 NOTE — PLAN OF CARE
[facility]  Physical Therapy Initial Evaluation    Name: Stacey Beth  Clinic Number: [id number]    Therapy Diagnosis:   Encounter Diagnoses   Name Primary?    Impaired gait and mobility     Acute pain of both knees     Decreased range of motion (ROM) of left knee     Decreased range of motion (ROM) of right knee     Decreased strength involving knee joint      Physician: Lionel Moreno MD    Physician Orders: PT Eval and Treat ; WBAT per surgical note.   Medical Diagnosis: S83.207A (ICD-10-CM) - Tear of meniscus of left knee as current injury, initial encounter  Evaluation Date: 9/27/2019  Authorization Period Expiration: 12/31/2019  Plan of Care Certification Period: 9/27/2019 to 11/13/2019  Visit # / Visits authorized: 1/ 1 (submit for auth)    Time In: 0910  Time Out: 0950  Total Billable Time: 40 minutes (1 LCE, 1 TE)  Precautions: Standard     Certified  present throughout.     Subjective   Stacey presents to PT post-of L knee medial meniscus repair and other concomitant procedures per Dr. Moreno. Sx date: 06/25/2019.  Saw Dr. Moreno approximately one month. ago  No PT since surgery.  Works as a ; has been back to work for two months.  Uses a brace at work to aid with her L knee pain.  She states that she is starting to experience R knee pain due to increased reliance on it.      Past Medical History:   Diagnosis Date    Arthritis     Hyperlipidemia     Hypothyroid      Stacey Beth  has a past surgical history that includes Repair of meniscus of knee (Left, 6/25/2019); Arthroscopic chondroplasty of knee joint (6/25/2019); and Cyst Removal (6/25/2019).    Stacey has a current medication list which includes the following prescription(s): diclofenac, levothyroxine, oxycodone-acetaminophen, and tramadol.    Review of patient's allergies indicates:  No Known Allergies     Imaging: none available to PT.   Prior Therapy: none  Social  History: Lives at home with family.    Occupation: see above  Prior Level of Function: progressing worsening L knee pain prior to surgery limiting her bending.   Current Level of Function: see above.    Pain:  Current 4/10, worst 6/10, best 2/10   Location: left knee (mostly anterior)  Description: Aching, Dull and Deep    Pts goals: to be rid of her L knee pain.     Objective     Palpation: TTP with direct pressure of knee arthroscopy portal sites.  TTP along posteromedial joint line.  Mild L knee joint effusion.    Posture: holding L knee in flexion during relaxed standing.  L ankle plantarflexion.    Gross Movement Analysis:  - Gait:  Mild antalgic gait pattern with lack of terminal knee extension during LLE stance phase.   - Squats: able to achieve 60 degrees flexion with mild L anterior knee pain.   - SLS:  <5 second hold on L, 15 second hold ability on the R.     Range of Motion:   LE Right Left   Hip flexion WNL WNL   Hip abduction WNL WNL   Hip ER WNL WNL   Hip IR  WNL WNL   Hip extension WNL WNL   Knee (+) 3 - 0 - 130 (-) 3 - 114 degrees  Anterior patellofemoral pain   Ankle DF WNL 5 degrees with knee flexion     Lower Extremity Strength                 LE           Right           Left   Hip flexion: 4/5 4/5   Hip abduction 5/5 4/5   Hip extension 5/5 4/5   Hip ER 5/5 4/5   Knee flexion 5/5 3+/5   Knee extension 5/5 3+/5   Ankle dorsiflexion: 5/5 5/5   Ankle plantarflexion: 4/5 WB 3/5 WB     Special Tests: L knee not tested    Joint Mobility: Mild loss of medial and superior patellar glide L knee  Sensation: intact light touch sensation to BLE throughout L2-S2 dermatomal pattern  Flexibility:    Ely's test: not tested   Popliteal Angle: not tested   Nik test: mild L hip flexor stiffness   P/SLR test: <70 degree L knee    CMS Impairment/Limitation/Restriction for FOTO Knee Survey    Therapist reviewed FOTO scores for Stacey Beth on 9/27/2019.   FOTO documents entered into EPIC - see Media  section.    Limitation Score: 47%  Predicted Limitation Score: 37%         TREATMENT   Treatment Time In: 0930  Treatment Time Out: 0950  Total Treatment time separate from Evaluation time:20 minutes    Stacey received therapeutic exercises to develop strength, endurance, ROM, flexibility, posture and core stabilization for 10 minutes including:  - quad sets    10 reps x 3 second holds  - SLR     2x10  - sidelying hip abduciton  1x10 LLE  - seated HS jed stretch  2x15 seconds LLE    Stacey received the following manual therapy techniques: total time 10 minutes  - grade III/IV L patellar glides  - grade III/IV L knee passive physiological flexion/extension in supine.       Home Exercises and Patient Education Provided  Education provided re:   - PT diagnosis and recommended treatment course.  - HEP as recommended  - continue to wear knee brace at work    Written Home Exercises Provided: yes.  Exercises were reviewed and Stacey was able to demonstrate them prior to the end of the session.   Pt received a written copy of exercises to perform at home. Stacey demonstrated good  understanding of the education provided.     See EMR under patient instructions for exercises given 9/27/2019     Assessment   Stacey is a 58 y.o. female referred to outpatient Physical Therapy with a medical diagnosis of s/p L knee medial meniscus repair with other concomitant procedures. Post-op week 15; no PT up to this point.  Pt presents with primary pain with activity along medial femoral condyle area. Lacks terminal L knee flexion/extension, patellar hypomobility, decreased L knee strength with quadriceps atrophy, L ankle equinovarus contracture due to gait changes, and impaired L knee proprioception.       Pt prognosis is Excellent.   Pt will benefit from skilled outpatient Physical Therapy to address the deficits stated above and in the chart below, provide pt/family education, and to maximize pt's level of independence.     Plan of  care discussed with patient: Yes  Pt's spiritual, cultural and educational needs considered and patient is agreeable to the plan of care and goals as stated below:     Anticipated Barriers for therapy: language barrier, work demands, lack of PT up to this point.     Medical Necessity is demonstrated by the following  History  Co-morbidities and personal factors that may impact the plan of care Co-morbidities:   arthritis    Personal Factors:   language barrier, lack of medical understanding     low   Examination  Body Structures and Functions, activity limitations and participation restrictions that may impact the plan of care Body Regions:   lower extremities    Body Systems:    gross symmetry  ROM  strength  balance  gait  transfers  transitions  motor control  motor learning  edema  scar formation    Participation Restrictions:   See above    Activity limitations:   Learning and applying knowledge  no deficits    General Tasks and Commands  no deficits    Communication  no deficits    Mobility  lifting and carrying objects  walking  using transportation (bus, train, plane, car)  driving (bike, car, motorcycle)    Self care  no deficits    Domestic Life  shopping  cooking  doing house work (cleaning house, washing dishes, laundry)  assisting others    Interactions/Relationships  no deficits    Life Areas  employment    Community and Social Life  community life  recreation and leisure         high   Clinical Presentation stable and uncomplicated low   Decision Making/ Complexity Score: low     Goals:  Long Term Goals: 3-4 weeks   1.  Patient will demonstrate 0-125 L knee PROM without anterior/posterior knee pain for improved tolerance to work demands.   2.  Patient will demonstrate L knee strength within 75% of R knee as tested with [employer].   3.  Patient will report <4/10 L knee pain at worst throughout her work day.   4.  Patient will demonstrate non-antalgic gait pattern on smooth level surface x 2000' for  community ambulation.   5.  Patient will negotiate 3 flights of stairs with single-UE/rail support without increased L knee pain.   6.  Patient will demonstrate to PT comprehensive understanding of each HEP component without verbal cueing.   7. Patient will report <35% limitation on Knee FOTO survey.     Plan   Certification Period/Plan of care expiration: 9/27/2019 to 11/13/2019.    Outpatient Physical Therapy 2 times weekly for 6 weeks to include the following interventions: Electrical Stimulation IFC/ Ethiopian, Gait Training, Manual Therapy, Moist Heat/ Ice, Neuromuscular Re-ed, Orthotic Management and Training, Patient Education, Self Care, Therapeutic Activites and Therapeutic Exercise, Dry Needling, IASTM, Kinesiotaping.     Herb Dunlap, PT     I certify the need for these services furnished under this plan of treatment and while under my care.  Lionel Moreno MD

## 2019-09-30 PROBLEM — R29.898 DECREASED STRENGTH INVOLVING KNEE JOINT: Status: ACTIVE | Noted: 2019-09-30

## 2019-09-30 PROBLEM — M25.561 ACUTE PAIN OF BOTH KNEES: Status: ACTIVE | Noted: 2019-09-30

## 2019-09-30 PROBLEM — R26.89 IMPAIRED GAIT AND MOBILITY: Status: ACTIVE | Noted: 2019-09-30

## 2019-09-30 PROBLEM — M25.661 DECREASED RANGE OF MOTION (ROM) OF RIGHT KNEE: Status: ACTIVE | Noted: 2019-09-30

## 2019-09-30 PROBLEM — M25.662 DECREASED RANGE OF MOTION (ROM) OF LEFT KNEE: Status: ACTIVE | Noted: 2019-09-30

## 2019-09-30 PROBLEM — M25.562 ACUTE PAIN OF BOTH KNEES: Status: ACTIVE | Noted: 2019-09-30

## 2019-10-11 ENCOUNTER — CLINICAL SUPPORT (OUTPATIENT)
Dept: REHABILITATION | Facility: HOSPITAL | Age: 59
End: 2019-10-11
Attending: ORTHOPAEDIC SURGERY
Payer: MEDICAID

## 2019-10-11 DIAGNOSIS — M25.662 DECREASED RANGE OF MOTION (ROM) OF LEFT KNEE: ICD-10-CM

## 2019-10-11 DIAGNOSIS — M25.562 ACUTE PAIN OF BOTH KNEES: ICD-10-CM

## 2019-10-11 DIAGNOSIS — R29.898 DECREASED STRENGTH INVOLVING KNEE JOINT: ICD-10-CM

## 2019-10-11 DIAGNOSIS — R26.89 IMPAIRED GAIT AND MOBILITY: ICD-10-CM

## 2019-10-11 DIAGNOSIS — M25.661 DECREASED RANGE OF MOTION (ROM) OF RIGHT KNEE: ICD-10-CM

## 2019-10-11 DIAGNOSIS — M25.561 ACUTE PAIN OF BOTH KNEES: ICD-10-CM

## 2019-10-11 PROCEDURE — 97110 THERAPEUTIC EXERCISES: CPT | Mod: PN

## 2019-10-11 NOTE — PROGRESS NOTES
Physical Therapy Daily Treatment Note     Name: Stacey Beth  Clinic Number: [id number]    Therapy Diagnosis:   Encounter Diagnoses   Name Primary?    Impaired gait and mobility     Acute pain of both knees     Decreased range of motion (ROM) of left knee     Decreased range of motion (ROM) of right knee     Decreased strength involving knee joint      Physician: Lionel Moreno MD    Visit Date: 10/11/2019    Physician Orders: PT Eval and Treat ; WBAT per surgical note.   Medical Diagnosis: S83.207A (ICD-10-CM) - Tear of meniscus of left knee as current injury, initial encounter  Evaluation Date: 9/27/2019  Authorization Period Expiration: 12/31/2019  Plan of Care Certification Period: 9/27/2019 to 11/13/2019    Visit # / Visits authorized: 1/ 12 (2 total)  FOTO: 2/5     Time In: 0800  Time Out: 0900  Total Billable Time: 40 minutes (3 TE)  Precautions: Standard      Certified  present throughout.     Subjective     Pt reports: L knee pain that is worse when she gets through working.   She was partially compliant with home exercise program.  Response to previous treatment: eval  Functional change: none voiced  Pain: 4/10  Location: left knee     Objective   O: Lacks terminal knee extension and flexion L knee with pain into flexion with overpressure.        Fair quad setting but quick too fatigue    Stacey received therapeutic exercises to develop strength, endurance, ROM and flexibility for 30 minutes with PT including assessment and 10 minutes under supervision:  - quad sets with towel    20 reps x 3 second holds  - SLR      3x10 1.5# ankle weight  - sidelying hip abduction   2x10 1.5# ankle weight  - sidelying clamshells    2x15  - seated knee extension isos   10 reps x 10 second holds at 90 degree flexion  - leg press     DL 4 plates 2x10 and SL 2.5 plates 2x10/each  - forward step-ups    Blue step with single UE support; LLE leading; 3x10     Stacey received the following manual  therapy techniques: total time 5 minutes  - grade III/IV superior patellar mobs L knee  - grade III/IV L knee passive physiological extension with overpressure    Stacey participated in neuromuscular re-education activities to improve: Balance, Kinesthetic, Sense and Proprioception for 10 minutes. The following activities were included:  Bosu ball static standing with emphasis on equal BLE weight bearing.      Home Exercises Provided and Patient Education Provided   Education provided:   - continue HEP    Written Home Exercises Provided: Patient instructed to cont prior HEP.  Exercises were reviewed and Stacey was able to demonstrate them prior to the end of the session.  Stacey demonstrated fair  understanding of the education provided.     See EMR under Patient Instructions for exercises provided 09/27/2019.    Assessment   A:  Lacks quad strength L and full ROM following L medial meniscal repair on 06/25/2019.  Reported no L knee pain following session.      Stacey is progressing well towards her goals.   Pt prognosis is Excellent.     Pt will continue to benefit from skilled outpatient physical therapy to address the deficits listed in the problem list box on initial evaluation, provide pt/family education and to maximize pt's level of independence in the home and community environment.     Pt's spiritual, cultural and educational needs considered and pt agreeable to plan of care and goals.     Anticipated barriers to physical therapy: language barrier, work demands    Goals:   Long Term Goals: 4-6 weeks   1.  Patient will demonstrate 0-125 L knee PROM without anterior/posterior knee pain for improved tolerance to work demands. progressing towards; not met  2.  Patient will demonstrate L knee strength within 75% of R knee as tested with MicroFit. progressing towards; not met  3.  Patient will report <4/10 L knee pain at worst throughout her work day. progressing towards; not met  4.  Patient will demonstrate  non-antalgic gait pattern on smooth level surface x 2000' for community ambulation.  progressing towards; not met  5.  Patient will negotiate 3 flights of stairs with single-UE/rail support without increased L knee pain. progressing towards; not met  6.  Patient will demonstrate to PT comprehensive understanding of each HEP component without verbal cueing. progressing towards; not met   7. Patient will report <35% limitation on Knee FOTO survey. progressing towards; not met    Plan   Continue plan of care.  Monitor response to interventions.  Adjust intensity accordingly.       Herb Dunlap, PT

## 2019-10-16 ENCOUNTER — CLINICAL SUPPORT (OUTPATIENT)
Dept: REHABILITATION | Facility: HOSPITAL | Age: 59
End: 2019-10-16
Attending: ORTHOPAEDIC SURGERY
Payer: MEDICAID

## 2019-10-16 DIAGNOSIS — M25.661 DECREASED RANGE OF MOTION (ROM) OF RIGHT KNEE: ICD-10-CM

## 2019-10-16 DIAGNOSIS — R29.898 DECREASED STRENGTH INVOLVING KNEE JOINT: ICD-10-CM

## 2019-10-16 DIAGNOSIS — R26.89 IMPAIRED GAIT AND MOBILITY: ICD-10-CM

## 2019-10-16 DIAGNOSIS — M25.561 ACUTE PAIN OF BOTH KNEES: ICD-10-CM

## 2019-10-16 DIAGNOSIS — M25.662 DECREASED RANGE OF MOTION (ROM) OF LEFT KNEE: ICD-10-CM

## 2019-10-16 DIAGNOSIS — M25.562 ACUTE PAIN OF BOTH KNEES: ICD-10-CM

## 2019-10-16 PROCEDURE — 97110 THERAPEUTIC EXERCISES: CPT | Mod: PN

## 2019-10-16 NOTE — PROGRESS NOTES
"  Physical Therapy Daily Treatment Note     Name: Stacey Beth  Clinic Number: [id number]    Therapy Diagnosis:   Encounter Diagnoses   Name Primary?    Impaired gait and mobility     Acute pain of both knees     Decreased range of motion (ROM) of left knee     Decreased range of motion (ROM) of right knee     Decreased strength involving knee joint      Physician: Lionel Moreno MD    Visit Date: 10/16/2019    Physician Orders: PT Eval and Treat ; WBAT per surgical note.   Medical Diagnosis: S83.207A (ICD-10-CM) - Tear of meniscus of left knee as current injury, initial encounter  Evaluation Date: 9/27/2019  Authorization Period Expiration: 12/31/2019  Plan of Care Certification Period: 9/27/2019 to 11/13/2019    Visit # / Visits authorized: 2/ 12 (3 total)  FOTO: 3/5     Time In: 0800  Time Out: 0845  Total Billable Time: 30 minutes (2 TE)  Precautions: Standard, no deep squats     Certified  present throughout.     Subjective     Pt reports: she is feeling less left knee pain today.   She was partially compliant with home exercise program.  Response to previous treatment: eval  Functional change: none voiced  Pain: 3/10  Location: left knee     Objective       Stacey received therapeutic exercises to develop strength, endurance, ROM and flexibility for 30 minutes including :  - quad sets with towel    2x10 reps x 5 second holds  - SLR      3x10 B 1.5# ankle weight  - sidelying hip abduction   3x10 B 1.5# ankle weight  - sidelying clamshells    3" hold 2x10 B  - seated knee extension isos   10 reps x 10 second holds at 90 degree flexion  - leg press     DL 4 plates 3x10 and SL 2.5 plates 2x10/each B  - forward step-ups    Blue step with single UE support; LLE leading; 3x10   - standing hip abduction:   2x10 B  - standing hip extension:   2x10 B  - Heel raises:     2x10    Stacey received the following manual therapy techniques: total time 0 minutes  NOT PERFORMED TODAY  - grade III/IV " "superior patellar mobs L knee  - grade III/IV L knee passive physiological extension with overpressure    Stacey participated in neuromuscular re-education activities to improve: Balance, Kinesthetic, Sense and Proprioception for 10 minutes. The following activities were included:  Bosu ball static standing with emphasis on equal BLE weight bearing.      Home Exercises Provided and Patient Education Provided   Education provided:   - continue HEP    Written Home Exercises Provided: Patient instructed to cont prior HEP.  Exercises were reviewed and Stacey was able to demonstrate them prior to the end of the session.  Stacey demonstrated fair  understanding of the education provided.     See EMR under Patient Instructions for exercises provided 09/27/2019.    Assessment   A:  Lacks quad strength L and full ROM following L medial meniscal repair on 06/25/2019.  Reported no L knee pain following session.  "No pain now"    Stacey is progressing well towards her goals.   Pt prognosis is Excellent.     Pt will continue to benefit from skilled outpatient physical therapy to address the deficits listed in the problem list box on initial evaluation, provide pt/family education and to maximize pt's level of independence in the home and community environment.     Pt's spiritual, cultural and educational needs considered and pt agreeable to plan of care and goals.     Anticipated barriers to physical therapy: language barrier, work demands    Goals:   Long Term Goals: 4-6 weeks   1.  Patient will demonstrate 0-125 L knee PROM without anterior/posterior knee pain for improved tolerance to work demands. progressing towards; not met  2.  Patient will demonstrate L knee strength within 75% of R knee as tested with Micro[employer]. progressing towards; not met  3.  Patient will report <4/10 L knee pain at worst throughout her work day. progressing towards; not met  4.  Patient will demonstrate non-antalgic gait pattern on smooth level " surface x 2000' for community ambulation.  progressing towards; not met  5.  Patient will negotiate 3 flights of stairs with single-UE/rail support without increased L knee pain. progressing towards; not met  6.  Patient will demonstrate to PT comprehensive understanding of each HEP component without verbal cueing. progressing towards; not met   7. Patient will report <35% limitation on Knee FOTO survey. progressing towards; not met    Plan   Continue plan of care.  Monitor response to interventions.  Adjust intensity accordingly.       Brigitteargenis Freeman, PTA

## 2019-10-18 ENCOUNTER — CLINICAL SUPPORT (OUTPATIENT)
Dept: REHABILITATION | Facility: HOSPITAL | Age: 59
End: 2019-10-18
Attending: ORTHOPAEDIC SURGERY
Payer: MEDICAID

## 2019-10-18 DIAGNOSIS — R29.898 DECREASED STRENGTH INVOLVING KNEE JOINT: ICD-10-CM

## 2019-10-18 DIAGNOSIS — M25.561 ACUTE PAIN OF BOTH KNEES: ICD-10-CM

## 2019-10-18 DIAGNOSIS — M25.562 ACUTE PAIN OF BOTH KNEES: ICD-10-CM

## 2019-10-18 DIAGNOSIS — M25.662 DECREASED RANGE OF MOTION (ROM) OF LEFT KNEE: ICD-10-CM

## 2019-10-18 DIAGNOSIS — M25.661 DECREASED RANGE OF MOTION (ROM) OF RIGHT KNEE: ICD-10-CM

## 2019-10-18 DIAGNOSIS — R26.89 IMPAIRED GAIT AND MOBILITY: ICD-10-CM

## 2019-10-18 PROCEDURE — 97110 THERAPEUTIC EXERCISES: CPT | Mod: PN

## 2019-10-18 NOTE — PROGRESS NOTES
"  Physical Therapy Daily Treatment Note     Name: Stacey Beth  Clinic Number: [id number]    Therapy Diagnosis:   Encounter Diagnoses   Name Primary?    Impaired gait and mobility     Acute pain of both knees     Decreased range of motion (ROM) of left knee     Decreased range of motion (ROM) of right knee     Decreased strength involving knee joint      Physician: Lionel Moreno MD    Visit Date: 10/18/2019    Physician Orders: PT Eval and Treat ; WBAT per surgical note.   Medical Diagnosis: S83.207A (ICD-10-CM) - Tear of meniscus of left knee as current injury, initial encounter  Evaluation Date: 9/27/2019  Authorization Period Expiration: 12/31/2019  Plan of Care Certification Period: 9/27/2019 to 11/13/2019    Visit # / Visits authorized: 3/ 12 (4 total)  FOTO: 4/5    Time In: 1000  Time Out: 1050  Total Billable Time: 50 min (3TE)  Precautions: Standard, no deep squats     Certified  present throughout.     Subjective     Pt reports she is doing slightly better. Continues to have some discomfort along the medial JL.  She was  partially compliant with home exercise program.  Response to previous treatment: eval  Functional change: none voiced  Pain: 3/10  Location: left knee     Objective       Stacey received therapeutic exercises to develop strength, endurance, ROM and flexibility for 30 minutes including :  - quad sets with towel    2x10 reps x 5 second holds  - SLR      3x10 B 1.5# ankle weight  - sidelying hip abduction   3x10 B 1.5# ankle weight  - Prone hip ext x 20  - Prone knee end x 20  - Eccentric  Hamstring pres x 20 with purple sport cord      - sidelying clamshells    3" hold 2x10 B  - seated knee extension isos   10 reps x 10 second holds at 90 degree flexion  - leg press     DL 4 plates 3x10 and SL 2.5 plates 2x10/each B  - forward step-ups    Blue step with single UE support; LLE leading; 3x10   - standing hip abduction:   2x10 B  - standing hip extension:   2x10 " B  -  Eccentric Heel raises:     2x10    Stacey received the following manual therapy techniques: total time 10 minutes   - grade III/IV superior patellar mobs L knee  - grade III/IV L knee passive physiological extension with overpressure    Stacey participated in neuromuscular re-education activities to improve: Balance, Kinesthetic, Sense and Proprioception for 10 minutes. The following activities were included:  Bosu ball static standing with emphasis on equal BLE weight bearing.      Home Exercises Provided and Patient Education Provided   Education provided:   - continue HEP    Written Home Exercises Provided: Patient instructed to cont prior HEP.  Exercises were reviewed and Stacey was able to demonstrate them prior to the end of the session.  Stacey demonstrated fair  understanding of the education provided.     See EMR under Patient Instructions for exercises provided 09/27/2019.    Assessment   A:  Pt had a good tolerance to today's tx session. Occasional discomfort along the medial JL but pt was able to complete all requested tasks. Incorporated eccentric loading of the gastroc and hamstring without adverse events.     Stacey is progressing well towards her goals.   Pt prognosis is Excellent.     Pt will continue to benefit from skilled outpatient physical therapy to address the deficits listed in the problem list box on initial evaluation, provide pt/family education and to maximize pt's level of independence in the home and community environment.     Pt's spiritual, cultural and educational needs considered and pt agreeable to plan of care and goals.     Anticipated barriers to physical therapy: language barrier, work demands    Goals:   Long Term Goals: 4-6 weeks   1.  Patient will demonstrate 0-125 L knee PROM without anterior/posterior knee pain for improved tolerance to work demands. progressing towards; not met  2.  Patient will demonstrate L knee strength within 75% of R knee as tested with  MicroFit. progressing towards; not met  3.  Patient will report <4/10 L knee pain at worst throughout her work day. progressing towards; not met  4.  Patient will demonstrate non-antalgic gait pattern on smooth level surface x 2000' for community ambulation.  progressing towards; not met  5.  Patient will negotiate 3 flights of stairs with single-UE/rail support without increased L knee pain. progressing towards; not met  6.  Patient will demonstrate to PT comprehensive understanding of each HEP component without verbal cueing. progressing towards; not met   7. Patient will report <35% limitation on Knee FOTO survey. progressing towards; not met    Plan   Continue plan of care.  Monitor response to interventions.  Adjust intensity accordingly.       Octavio Dunlap, PT

## 2019-10-21 NOTE — PROGRESS NOTES
"  Physical Therapy Daily Treatment Note     Name: Stacey Beth  Clinic Number: [id number]    Therapy Diagnosis: No diagnosis found.  Physician: Lionel Moreno MD    Visit Date: 10/22/2019     Physician Orders: PT Eval and Treat ; WBAT per surgical note.   Medical Diagnosis: S83.207A (ICD-10-CM) - Tear of meniscus of left knee as current injury, initial encounter  Evaluation Date: 9/27/2019  Authorization Period Expiration: 12/31/2019  Plan of Care Certification Period: 9/27/2019 to 11/13/2019     Visit # / Visits authorized: 4/ 12 (5 total)  FOTO: 5/5  PTA visit: 1/6     Time In: ***  Time Out: ***  Total Billable Time: *** min (3TE)  Precautions: Standard, no deep squats    Subjective     Pt reports: ***.  She {Actions; was/was not:42366} compliant with home exercise program.  Response to previous treatment: ***  Functional change: ***    Pain: {0-10:54570::"0"}/10  Location: {RIGHT/LEFT/BILATERAL:14393} {LOCATION ON BODY:95325}     Objective     Stacey received the following manual therapy techniques: {AMB PT PROGRESS MANUAL THERAPY:04729} were applied to the: *** for *** minutes, including:  ***  - grade III/IV superior patellar mobs L knee  - grade III/IV L knee passive physiological extension with overpressure    Stacey received therapeutic exercises to develop {AMB PT PROGRESS OBJECTIVE:04568} for *** minutes including:  ***  - quad sets with towel                                     2x10 reps x 5 second holds  - SLR                                                               3x10 B 1.5# ankle weight  - sidelying hip abduction                                 3x10 B 1.5# ankle weight  - Prone hip ext x 20  - Prone knee end x 20  - Eccentric  Hamstring pres x 20 with purple sport cord        - sidelying clamshells                                      3" hold 2x10 B  - seated knee extension isos                          10 reps x 10 second holds at 90 degree flexion  - leg press                            " "                           DL 4 plates 3x10 and SL 2.5 plates 2x10/each B  - forward step-ups                                           Blue step with single UE support; LLE leading; 3x10   - standing hip abduction:                             2x10 B  - standing hip extension:                             2x10 B  -  Eccentric Heel raises:                                                       2x10    Stacey participated in neuromuscular re-education activities to improve: {AMB PT PROGRESS NEURO RE-ED:99275} for *** minutes. The following activities were included:  ***  Bosu ball static standing with emphasis on equal BLE weight bearing.    Home Exercises Provided and Patient Education Provided     Education provided:   - ***    Written Home Exercises Provided: {Blank single:82785::"yes","Patient instructed to cont prior HEP"}.  Exercises were reviewed and Stacey was able to demonstrate them prior to the end of the session.  Stacey demonstrated {Desc; good/fair/poor:35166} understanding of the education provided.     See EMR under {Blank single:09862::"Media","Patient Instructions"} for exercises provided {Blank single:77726::"10/22/2019","prior visit"}.      Assessment     ***  Stacey {IS/IS NOT:06704} progressing well towards her goals.   Pt prognosis is {REHAB PROGNOSIS OHS:23472}.     Pt will continue to benefit from skilled outpatient physical therapy to address the deficits listed in the problem list box on initial evaluation, provide pt/family education and to maximize pt's level of independence in the home and community environment.     Pt's spiritual, cultural and educational needs considered and pt agreeable to plan of care and goals.    Anticipated barriers to physical therapy: language barrier, work demands    Goals:   Long Term Goals: 4-6 weeks   1.  Patient will demonstrate 0-125 L knee PROM without anterior/posterior knee pain for improved tolerance to work demands. progressing towards; not met  2. "  Patient will demonstrate L knee strength within 75% of R knee as tested with [employer]Fit. progressing towards; not met  3.  Patient will report <4/10 L knee pain at worst throughout her work day. progressing towards; not met  4.  Patient will demonstrate non-antalgic gait pattern on smooth level surface x 2000' for community ambulation.  progressing towards; not met  5.  Patient will negotiate 3 flights of stairs with single-UE/rail support without increased L knee pain. progressing towards; not met  6.  Patient will demonstrate to PT comprehensive understanding of each HEP component without verbal cueing. progressing towards; not met   7. Patient will report <35% limitation on Knee FOTO survey. progressing towards; not met    Plan     ***    Delia Pena, PTA

## 2019-10-22 ENCOUNTER — CLINICAL SUPPORT (OUTPATIENT)
Dept: REHABILITATION | Facility: HOSPITAL | Age: 59
End: 2019-10-22
Attending: ORTHOPAEDIC SURGERY
Payer: MEDICAID

## 2019-10-22 DIAGNOSIS — M25.561 ACUTE PAIN OF BOTH KNEES: ICD-10-CM

## 2019-10-22 DIAGNOSIS — R29.898 DECREASED STRENGTH INVOLVING KNEE JOINT: ICD-10-CM

## 2019-10-22 DIAGNOSIS — R26.89 IMPAIRED GAIT AND MOBILITY: ICD-10-CM

## 2019-10-22 DIAGNOSIS — M25.661 DECREASED RANGE OF MOTION (ROM) OF RIGHT KNEE: ICD-10-CM

## 2019-10-22 DIAGNOSIS — M25.662 DECREASED RANGE OF MOTION (ROM) OF LEFT KNEE: ICD-10-CM

## 2019-10-22 DIAGNOSIS — M25.562 ACUTE PAIN OF BOTH KNEES: ICD-10-CM

## 2019-10-22 PROCEDURE — 97110 THERAPEUTIC EXERCISES: CPT | Mod: PN

## 2019-10-22 NOTE — PROGRESS NOTES
Physical Therapy Daily Treatment Note     Name: Stacey Beth  Clinic Number: [id number]    Therapy Diagnosis:   Encounter Diagnoses   Name Primary?    Impaired gait and mobility     Acute pain of both knees     Decreased range of motion (ROM) of left knee     Decreased range of motion (ROM) of right knee     Decreased strength involving knee joint      Physician: Lionel Moreno MD    Visit Date: 10/22/2019    Physician Orders: PT Eval and Treat ; WBAT per surgical note.   Medical Diagnosis: S83.207A (ICD-10-CM) - Tear of meniscus of left knee as current injury, initial encounter  Evaluation Date: 9/27/2019  Authorization Period Expiration: 12/31/2019  Plan of Care Certification Period: 9/27/2019 to 11/13/2019    Visit # / Visits authorized: 4/ 12 (5 total)  FOTO: 5/5 NEXT    Time In: 1000  Time Out: 1100  Total Billable Time: 55 min (4 TE)  Precautions: Standard, no deep squats     Certified  present throughout.     Subjective     Pt reports that she is only experience mild pain with work in her L knee.    She was  partially compliant with home exercise program.  Response to previous treatment: eval  Functional change: none voiced  Pain: 3/10  Location: left knee (medial joint line area)    Objective       Stacey received therapeutic exercises to develop strength, endurance, ROM and flexibility for 35 minutes including :  - quad sets with towel    2x10 reps x 5 second holds  - SLR      3x10 B 1.5# ankle weight  - sidelying hip abduction   3x10 B 1.5# ankle weight  - Prone hip ext     3x10  - Prone knee end     x 20  - Eccentric  Hamstring pres    x 20 with purple sport cord (not performed today)     - leg press     DL 6 plates 3x10 and SL 4 plates 2x10/each B  - HS curl machine    3x10 at 2 plates LLE  - forward step-ups    Blue step with single UE support; LLE leading; 3x10 (not performed today)  - wall squats     5x20 second holds  - bike      3' (cool-down)      Stacey received the  following manual therapy techniques: total time 10 minutes   - grade III/IV superior patellar mobs L knee  - grade III/IV L knee passive physiological extension with overpressure    Stacey participated in neuromuscular re-education activities to improve: Balance, Kinesthetic, Sense and Proprioception for 15 minutes. The following activities were included:  - modified dead lifts to stool (4# dumbells); 3x10   - SLS with B shoulder extension BTB; 2x10 on level surface and 2x10 on foam pad        Home Exercises Provided and Patient Education Provided   Education provided:   - continue HEP    Written Home Exercises Provided: Patient instructed to cont prior HEP.  Exercises were reviewed and Stacey was able to demonstrate them prior to the end of the session.  Stacey demonstrated fair  understanding of the education provided.     See EMR under Patient Instructions for exercises provided 09/27/2019.    Assessment   A:  Continues to tolerance session well without L knee joint pain.  Lacks terminal knee flexion but with empty end-feel and pain at around 120 degrees.     Stacey is progressing well towards her goals.   Pt prognosis is Excellent.     Pt will continue to benefit from skilled outpatient physical therapy to address the deficits listed in the problem list box on initial evaluation, provide pt/family education and to maximize pt's level of independence in the home and community environment.   Pt's spiritual, cultural and educational needs considered and pt agreeable to plan of care and goals.     Anticipated barriers to physical therapy: language barrier, work demands    Goals:   Long Term Goals: 4-6 weeks   1.  Patient will demonstrate 0-125 L knee PROM without anterior/posterior knee pain for improved tolerance to work demands. progressing towards; not met  2.  Patient will demonstrate L knee strength within 75% of R knee as tested with [employer]. progressing towards; not met  3.  Patient will report <4/10 L knee  pain at worst throughout her work day. progressing towards; not met  4.  Patient will demonstrate non-antalgic gait pattern on smooth level surface x 2000' for community ambulation.  progressing towards; not met  5.  Patient will negotiate 3 flights of stairs with single-UE/rail support without increased L knee pain. progressing towards; not met  6.  Patient will demonstrate to PT comprehensive understanding of each HEP component without verbal cueing. progressing towards; not met   7. Patient will report <35% limitation on Knee FOTO survey. progressing towards; not met    Plan   Continue plan of care.  Monitor response to interventions.  Adjust intensity accordingly.  DC planning.       Herb Dunlap, PT

## 2019-10-25 ENCOUNTER — CLINICAL SUPPORT (OUTPATIENT)
Dept: REHABILITATION | Facility: HOSPITAL | Age: 59
End: 2019-10-25
Attending: ORTHOPAEDIC SURGERY
Payer: MEDICAID

## 2019-10-25 DIAGNOSIS — M25.662 DECREASED RANGE OF MOTION (ROM) OF LEFT KNEE: ICD-10-CM

## 2019-10-25 DIAGNOSIS — M25.562 ACUTE PAIN OF BOTH KNEES: ICD-10-CM

## 2019-10-25 DIAGNOSIS — R29.898 DECREASED STRENGTH INVOLVING KNEE JOINT: ICD-10-CM

## 2019-10-25 DIAGNOSIS — M25.561 ACUTE PAIN OF BOTH KNEES: ICD-10-CM

## 2019-10-25 DIAGNOSIS — R26.89 IMPAIRED GAIT AND MOBILITY: ICD-10-CM

## 2019-10-25 DIAGNOSIS — M25.661 DECREASED RANGE OF MOTION (ROM) OF RIGHT KNEE: ICD-10-CM

## 2019-10-25 PROCEDURE — 97110 THERAPEUTIC EXERCISES: CPT | Mod: PN

## 2019-10-25 NOTE — PROGRESS NOTES
Physical Therapy Daily Treatment Note     Name: Stacey Beth  Clinic Number: [id number]    Therapy Diagnosis:   Encounter Diagnoses   Name Primary?    Impaired gait and mobility     Acute pain of both knees     Decreased range of motion (ROM) of left knee     Decreased range of motion (ROM) of right knee     Decreased strength involving knee joint      Physician: Lionel Moreno MD    Visit Date: 10/25/2019    Physician Orders: PT Eval and Treat ; WBAT per surgical note.   Medical Diagnosis: S83.207A (ICD-10-CM) - Tear of meniscus of left knee as current injury, initial encounter  Evaluation Date: 9/27/2019  Authorization Period Expiration: 12/31/2019  Plan of Care Certification Period: 9/27/2019 to 11/13/2019    Visit # / Visits authorized: 5/ 12 (6 total)  FOTO: NEXT     Time In: 0900  Time Out: 1000  Total Billable Time: 55 min (4 TE)  Precautions: Standard, no deep squats     Certified  present throughout.     Subjective     Pt reports increased L calf soreness following her last PT session; continue until today.   Last session 3 days ago.   She was  partially compliant with home exercise program.  Response to previous treatment: muscle soreness  Functional change: continues to work  Pain: 3/10  Location: left knee (medial joint line area) and L calf muscle    Objective   O: Pain L knee with terminal flexion.  (+) Ely's test for quadriceps tightness KHANH Ibarra received therapeutic exercises to develop strength, endurance, ROM and flexibility for 40 minutes including :  - bike      5' L3  - SLR      3x10 B 1.5# ankle weight  - sidelying hip abduction   3x10 B 1.5# ankle weight  - Prone hip ext     3x10 B 1.5# ankle weight  - Prone knee end     x 20  - Eccentric  Hamstring pres    x 20 with purple sport cord (not performed today)     - leg press     DL 6 plates 3x10 and SL 4 plates 2x10/each B        DL 6 plates 5 reps x 10 second mid-range holds  - HS curl machine    Not performed  today  - forward step-ups    Blue step with single UE support; LLE leading; 3x10 (not performed today)  - wall squats     Not performed today  - steamboats     3x10 RTB      Stacey received the following manual therapy techniques: total time 10 minutes  - grade III/IV L knee passive physiological flexion  - prone quad stretch  - STM to L posterior leg    Stacey participated in neuromuscular re-education activities to improve: Balance, Kinesthetic, Sense and Proprioception for 5 minutes. The following activities were included:  - modified dead lifts to stool (4# dumbells); 3x10  (not performed today)  - SLS with B shoulder extension BTB; 2x10 on level surface and 2x10 on foam pad (not performed today)  - chair squats 2x10 normal, 2x10 single (foam pad in chair)        Home Exercises Provided and Patient Education Provided   Education provided:   - continue HEP    Written Home Exercises Provided: Patient instructed to cont prior HEP.  Exercises were reviewed and Stacey was able to demonstrate them prior to the end of the session.  Stacey demonstrated fair  understanding of the education provided.     See EMR under Patient Instructions for exercises provided 09/27/2019.    Assessment   A:  L knee pain.  S/p L medial meniscal repair. Post-op 4 months.  Delayed starting PT.  L quadriceps, hamstring, and gastrocnemius weakness.  L quadriceps tightness.     Stacey is progressing well towards her goals.   Pt prognosis is Excellent.     Pt will continue to benefit from skilled outpatient physical therapy to address the deficits listed in the problem list box on initial evaluation, provide pt/family education and to maximize pt's level of independence in the home and community environment.   Pt's spiritual, cultural and educational needs considered and pt agreeable to plan of care and goals.     Anticipated barriers to physical therapy: language barrier, work demands    Goals:   Long Term Goals: 4-6 weeks   1.  Patient will  demonstrate 0-125 L knee PROM without anterior/posterior knee pain for improved tolerance to work demands. progressing towards; not met  2.  Patient will demonstrate L knee strength within 75% of R knee as tested with MicroFit. progressing towards; not met  3.  Patient will report <4/10 L knee pain at worst throughout her work day. progressing towards; not met  4.  Patient will demonstrate non-antalgic gait pattern on smooth level surface x 2000' for community ambulation.  progressing towards; not met  5.  Patient will negotiate 3 flights of stairs with single-UE/rail support without increased L knee pain. progressing towards; not met  6.  Patient will demonstrate to PT comprehensive understanding of each HEP component without verbal cueing. progressing towards; not met   7. Patient will report <35% limitation on Knee FOTO survey. progressing towards; not met    Plan   Continue plan of care.  Monitor response to interventions.  Adjust intensity accordingly.  DC planning.       Herb Dunlap, PT v

## 2019-10-29 ENCOUNTER — CLINICAL SUPPORT (OUTPATIENT)
Dept: REHABILITATION | Facility: HOSPITAL | Age: 59
End: 2019-10-29
Attending: ORTHOPAEDIC SURGERY
Payer: MEDICAID

## 2019-10-29 DIAGNOSIS — M25.661 DECREASED RANGE OF MOTION (ROM) OF RIGHT KNEE: ICD-10-CM

## 2019-10-29 DIAGNOSIS — R29.898 DECREASED STRENGTH INVOLVING KNEE JOINT: ICD-10-CM

## 2019-10-29 DIAGNOSIS — M25.662 DECREASED RANGE OF MOTION (ROM) OF LEFT KNEE: ICD-10-CM

## 2019-10-29 DIAGNOSIS — R26.89 IMPAIRED GAIT AND MOBILITY: ICD-10-CM

## 2019-10-29 DIAGNOSIS — M25.562 ACUTE PAIN OF BOTH KNEES: ICD-10-CM

## 2019-10-29 DIAGNOSIS — M25.561 ACUTE PAIN OF BOTH KNEES: ICD-10-CM

## 2019-10-29 PROCEDURE — 97110 THERAPEUTIC EXERCISES: CPT | Mod: PN

## 2019-10-29 NOTE — PROGRESS NOTES
Physical Therapy Daily Treatment Note     Name: Stacey Beth  [facility] Number: [id number]    Therapy Diagnosis:   Encounter Diagnoses   Name Primary?    Impaired gait and mobility     Acute pain of both knees     Decreased range of motion (ROM) of left knee     Decreased range of motion (ROM) of right knee     Decreased strength involving knee joint      Physician: Lionel Moreno MD    Visit Date: 10/29/2019    Physician Orders: PT Eval and Treat ; WBAT per surgical note.   Medical Diagnosis: S83.207A (ICD-10-CM) - Tear of meniscus of left knee as current injury, initial encounter  Evaluation Date: 9/27/2019  Authorization Period Expiration: 12/31/2019  Plan of Care Certification Period: 9/27/2019 to 11/13/2019     Visit # / Visits authorized: 6/ 12 (7 total)  FOTO: 6/10      Time In: 0900  Time Out: 0955  Total Billable Time: 55 min (4 TE)  Precautions: Standard, no deep squats     Certified  present throughout.     Subjective     Pt reports: she feels as though she is getting better with therapy.  Improved pain levels and feeling stronger  She was compliant with home exercise program.  Response to previous treatment: no adverse reaction  Functional change: none    Pain: 0/10  Location: left knee      Objective     Stacey received the following manual therapy techniques: Soft tissue Mobilization and manual stretch were applied to the: left knee for 5 minutes, including:    - grade III/IV L knee passive physiological flexion  - prone quad stretch  - STM to L posterior leg    Stacey received therapeutic exercises to develop strength, endurance and ROM for 45 minutes including:    - bike                                                               5' L3  - SLR                                                               3x10 B 1.5# ankle weight  - sidelying hip abduction                                 3x10 B 1.5# ankle weight  - Prone hip ext                                                 3x10 B 1.5# ankle weight  - Prone knee end                                            x 20  - Eccentric  Hamstring pres                            x 20 with purple sport cord (not performed today)     - leg press                                                       DL 6 plates 3x10 and SL 4 plates 2x10/each B                                                                          DL 6 plates 5 reps x 10 second mid-range holds  - HS curl machine                                           Not performed today  - forward step-ups                                           Blue step with single UE support; LLE leading; 3x10  - wall squats                                                    Not performed today  - steamboats                                                   3x10 RTB       Stacey participated in neuromuscular re-education activities to improve: Balance, Kinesthetic, Sense and Proprioception for 5 minutes. The following activities were included:    - modified dead lifts to stool (4# dumbells); 3x10  (not performed today)  - SLS with B shoulder extension BTB; 2x10 on level surface and 2x10 on foam pad (not performed today)  - chair squats 2x10 normal, 2x10 single (foam pad in chair)    Home Exercises Provided and Patient Education Provided     Education provided:   - cont HEP to maximize therapy benefits    Written Home Exercises Provided: Patient instructed to cont prior HEP.  Exercises were reviewed and Stacey was able to demonstrate them prior to the end of the session.  Stacey demonstrated good  understanding of the education provided.     See EMR under Patient Instructions for exercises provided 9/27/19.      Assessment     Pt tolerates therapy activities without difficulties or c/o increased pain.  Pt denies any pain upon completion of therapy session.  Stacey is progressing well towards her goals.   Pt prognosis is Excellent.     Pt will continue to benefit from skilled outpatient physical therapy to  address the deficits listed in the problem list box on initial evaluation, provide pt/family education and to maximize pt's level of independence in the home and community environment.     Pt's spiritual, cultural and educational needs considered and pt agreeable to plan of care and goals.    Anticipated barriers to physical therapy: language barrier, work demands    Goals:   Long Term Goals: 4-6 weeks   1.  Patient will demonstrate 0-125 L knee PROM without anterior/posterior knee pain for improved tolerance to work demands. progressing towards; not met  2.  Patient will demonstrate L knee strength within 75% of R knee as tested with MicroFit. progressing towards; not met  3.  Patient will report <4/10 L knee pain at worst throughout her work day. progressing towards; not met  4.  Patient will demonstrate non-antalgic gait pattern on smooth level surface x 2000' for community ambulation.  progressing towards; not met  5.  Patient will negotiate 3 flights of stairs with single-UE/rail support without increased L knee pain. progressing towards; not met  6.  Patient will demonstrate to PT comprehensive understanding of each HEP component without verbal cueing. progressing towards; not met   7. Patient will report <35% limitation on Knee FOTO survey. progressing towards; not met    Plan     Cont POC to progress towards established goals    Delia Pena, FERNANDA

## 2019-10-31 NOTE — PROGRESS NOTES
Physical Therapy Daily Treatment Note     Name: Stacey Beth  Clinic Number: [id number]    Therapy Diagnosis:   No diagnosis found.  Physician: Lionel Moreno MD    Visit Date: 11/1/2019    Physician Orders: PT Eval and Treat ; WBAT per surgical note.   Medical Diagnosis: S83.207A (ICD-10-CM) - Tear of meniscus of left knee as current injury, initial encounter  Evaluation Date: 9/27/2019  Authorization Period Expiration: 12/31/2019  Plan of Care Certification Period: 9/27/2019 to 11/13/2019     Visit # / Visits authorized: 7/ 12 (8 total)  FOTO: 7/10      Time In: 0900  Time Out: 1000  Total Billable Time: 40 minutes (3 TE)  Precautions: Standard, no deep squats     Certified  present throughout.     Subjective     Pt reports: mild L anterior knee pain but overall she states that she is feeling 'much better'.  Continues to work a full week as a .   She was compliant with home exercise program.  Response to previous treatment: no adverse reaction  Functional change: none  Pain: 1/10  Location: left knee      Objective   L knee AROM: 0 - 119 degrees  L knee PROM: (+) 3 - 0 - 125 pain with terminal flexion.     L/E Strength w/ MicroFET Muscle Verena Dynamometer Right Left Pain/Dysfunction with Movement   (approx 4 sec hold w/ max contraction)   Quadriceps 18.4 kg  15.7 kg     Hamstrings 12.6 kg  11.8 kg         Stacey received the following manual therapy techniques: 5 minutes  - grade III/IV L knee passive physiological flexion  - prone quad stretch  - STM to L posterior leg    Stacey received therapeutic exercises to develop strength, endurance and ROM for 25 minutes with PT 1:1 including assessment and 10 minutes under supervision:  - bike                                                               5' L3  - SLR                                                               3x10 B 2# ankle weight  - sidelying hip abduction                                 3x10 B 2 # ankle  weight  - Prone hip ext                                                3x10 B 2# ankle weight     - leg press                                                       DL 7 plates 3x10 and SL 4 plates 2x10/each B                                               Heel  Raises 2x15 6 plates                                  - lateral step-ups                                           Blue step with single UE support; LLE leading; 3x10  - steamboats                                                   3x10 RTB       Stacey participated in neuromuscular re-education activities to improve: Balance, Kinesthetic, Sense and Proprioception for 10 minutes. The following activities were included:  - rebounder ball toss 5 rounds DL on foam pad and 5 rounds SLS  - shuttle press DL hop 2.5 cord; 2x10  - shuttle press SL hop LLE 1.5 cord; 2x10    Home Exercises Provided and Patient Education Provided   Education provided:   - cont HEP to maximize therapy benefits    Written Home Exercises Provided: Patient instructed to cont prior HEP.  Exercises were reviewed and Stacey was able to demonstrate them prior to the end of the session.  Stacey demonstrated good  understanding of the education provided.   See EMR under Patient Instructions for exercises provided 9/27/19.      Assessment   A:  L knee pain.  S/p L medial meniscal repair. Post-op 4 months.  Delayed starting PT.  L quadriceps, hamstring, and gastrocnemius weakness.  L quadriceps tightness. DC planning.      Stacey is progressing well towards her goals.   Pt prognosis is Excellent.     Pt will continue to benefit from skilled outpatient physical therapy to address the deficits listed in the problem list box on initial evaluation, provide pt/family education and to maximize pt's level of independence in the home and community environment.   Pt's spiritual, cultural and educational needs considered and pt agreeable to plan of care and goals.    Anticipated barriers to physical therapy:  language barrier, work demands    Goals:   Long Term Goals: 4-6 weeks   1.  Patient will demonstrate 0-125 L knee PROM without anterior/posterior knee pain for improved tolerance to work demands. progressing towards; not met  2.  Patient will demonstrate L knee strength within 75% of R knee as tested with MicroFit. MET 11/1/2019  3.  Patient will report <4/10 L knee pain at worst throughout her work day. MET 11/1/2019  4.  Patient will demonstrate non-antalgic gait pattern on smooth level surface x 2000' for community ambulation.  progressing towards; not met  5.  Patient will negotiate 3 flights of stairs with single-UE/rail support without increased L knee pain. progressing towards; not met  6.  Patient will demonstrate to PT comprehensive understanding of each HEP component without verbal cueing. progressing towards; not met   7. Patient will report <35% limitation on Knee FOTO survey. progressing towards; not met    Plan     Cont POC to progress towards established goals. DC planning 1-2 more visits.     Herb Dunlap, PT

## 2019-11-01 ENCOUNTER — CLINICAL SUPPORT (OUTPATIENT)
Dept: REHABILITATION | Facility: HOSPITAL | Age: 59
End: 2019-11-01
Attending: ORTHOPAEDIC SURGERY
Payer: MEDICAID

## 2019-11-01 DIAGNOSIS — M25.561 ACUTE PAIN OF BOTH KNEES: ICD-10-CM

## 2019-11-01 DIAGNOSIS — R29.898 DECREASED STRENGTH INVOLVING KNEE JOINT: ICD-10-CM

## 2019-11-01 DIAGNOSIS — M25.661 DECREASED RANGE OF MOTION (ROM) OF RIGHT KNEE: ICD-10-CM

## 2019-11-01 DIAGNOSIS — M25.662 DECREASED RANGE OF MOTION (ROM) OF LEFT KNEE: ICD-10-CM

## 2019-11-01 DIAGNOSIS — M25.562 ACUTE PAIN OF BOTH KNEES: ICD-10-CM

## 2019-11-01 DIAGNOSIS — R26.89 IMPAIRED GAIT AND MOBILITY: ICD-10-CM

## 2019-11-01 PROCEDURE — 97110 THERAPEUTIC EXERCISES: CPT | Mod: PN

## 2019-11-05 ENCOUNTER — CLINICAL SUPPORT (OUTPATIENT)
Dept: REHABILITATION | Facility: HOSPITAL | Age: 59
End: 2019-11-05
Attending: ORTHOPAEDIC SURGERY
Payer: MEDICAID

## 2019-11-05 DIAGNOSIS — R29.898 DECREASED STRENGTH INVOLVING KNEE JOINT: ICD-10-CM

## 2019-11-05 DIAGNOSIS — M25.561 ACUTE PAIN OF BOTH KNEES: ICD-10-CM

## 2019-11-05 DIAGNOSIS — M25.661 DECREASED RANGE OF MOTION (ROM) OF RIGHT KNEE: ICD-10-CM

## 2019-11-05 DIAGNOSIS — M25.662 DECREASED RANGE OF MOTION (ROM) OF LEFT KNEE: ICD-10-CM

## 2019-11-05 DIAGNOSIS — M25.562 ACUTE PAIN OF BOTH KNEES: ICD-10-CM

## 2019-11-05 DIAGNOSIS — R26.89 IMPAIRED GAIT AND MOBILITY: ICD-10-CM

## 2019-11-05 PROCEDURE — 97110 THERAPEUTIC EXERCISES: CPT | Mod: PN | Performed by: PHYSICAL THERAPIST

## 2019-11-05 NOTE — PROGRESS NOTES
Physical Therapy Daily Treatment Note     Name: Stacey Beth  Clinic Number: [id number]    Therapy Diagnosis:   Encounter Diagnoses   Name Primary?    Impaired gait and mobility     Acute pain of both knees     Decreased range of motion (ROM) of left knee     Decreased range of motion (ROM) of right knee     Decreased strength involving knee joint      Physician: Lionel Moreno MD    Visit Date: 11/5/2019    Physician Orders: PT Eval and Treat ; WBAT per surgical note.   Medical Diagnosis: S83.207A (ICD-10-CM) - Tear of meniscus of left knee as current injury, initial encounter  Evaluation Date: 9/27/2019  Authorization Period Expiration: 12/31/2019  Plan of Care Certification Period: 9/27/2019 to 11/13/2019     Visit # / Visits authorized: 8/ 12 (9 total)  FOTO: 8/10      Time In: 0900  Time Out: 1000  Total Billable Time: 60 minutes (4 TE)  Precautions: Standard, no deep squats     Certified  present throughout.     Subjective     Pt reports: nuzhat-medial knee sensitivity and discomfort   She was compliant with home exercise program.  Response to previous treatment: no adverse reaction  Functional change: none  Pain: 1/10  Location: left knee      Objective        Stacey received the following manual therapy techniques: 10 minutes  - grade III/IV L knee passive physiological flexion  - prone quad stretch  - STM to L posterior leg  - ASTYM to medial/anterior knee    Stacey received therapeutic exercises to develop strength, endurance and ROM for 35 minutes including assessment:  - bike                                                               5' L3  - SLR                                                               3x10 B 2# ankle weight  - sidelying hip abduction                                 3x10 B 2 # ankle weight  - Prone hip ext                                                3x10 B 2# ankle weight     - leg press                                                       DL 7  plates 3x10 and SL 4 plates 2x10/each B                                               Heel  Raises 2x15 6 plates                                  - lateral step-ups                                           Blue step with single UE support; LLE leading; 3x10  - steamboats                                                   3x10 RTB       Stacey participated in neuromuscular re-education activities to improve: Balance, Kinesthetic, Sense and Proprioception for 15 minutes. The following activities were included:  - rebounder ball toss 5 rounds DL on foam pad and 5 rounds SLS  - shuttle press DL hop 2.5 cord; 2x10  - shuttle press SL hop LLE 1.5 cord; 2x10    Home Exercises Provided and Patient Education Provided   Education provided:   - cont HEP to maximize therapy benefits    Written Home Exercises Provided: Patient instructed to cont prior HEP.  Exercises were reviewed and Stacey was able to demonstrate them prior to the end of the session.  Stacey demonstrated good  understanding of the education provided.   See EMR under Patient Instructions for exercises provided 9/27/19.      Assessment   A:  Left knee medial knee pain with weight bearing, improving symptoms and ROM with minimal difficulty with therex. .      Stacey is progressing well towards her goals.   Pt prognosis is Excellent.     Pt will continue to benefit from skilled outpatient physical therapy to address the deficits listed in the problem list box on initial evaluation, provide pt/family education and to maximize pt's level of independence in the home and community environment.   Pt's spiritual, cultural and educational needs considered and pt agreeable to plan of care and goals.    Anticipated barriers to physical therapy: language barrier, work demands    Goals:   Long Term Goals: 4-6 weeks   1.  Patient will demonstrate 0-125 L knee PROM without anterior/posterior knee pain for improved tolerance to work demands. progressing towards; not met  2.   Patient will demonstrate L knee strength within 75% of R knee as tested with MicroFit. MET 11/5/2019  3.  Patient will report <4/10 L knee pain at worst throughout her work day. MET 11/5/2019  4.  Patient will demonstrate non-antalgic gait pattern on smooth level surface x 2000' for community ambulation.  progressing towards; not met  5.  Patient will negotiate 3 flights of stairs with single-UE/rail support without increased L knee pain. progressing towards; not met  6.  Patient will demonstrate to PT comprehensive understanding of each HEP component without verbal cueing. progressing towards; not met   7. Patient will report <35% limitation on Knee FOTO survey. progressing towards; not met    Plan     Cont POC to progress towards established goals. DC planning 1-2 more visits.     Jerman Zhou, PT

## 2019-11-06 NOTE — PROGRESS NOTES
"  Physical Therapy Daily Treatment Note     Name: Stacey Beth  Clinic Number: [id number]    Therapy Diagnosis:   Encounter Diagnoses   Name Primary?    Impaired gait and mobility     Acute pain of both knees     Decreased range of motion (ROM) of left knee     Decreased range of motion (ROM) of right knee     Decreased strength involving knee joint      Physician: Lionel Moreno MD    Visit Date: 11/7/2019    Physician Orders: PT Eval and Treat ; WBAT per surgical note.   Medical Diagnosis: S83.207A (ICD-10-CM) - Tear of meniscus of left knee as current injury, initial encounter  Evaluation Date: 9/27/2019  Authorization Period Expiration: 12/31/2019  Plan of Care Certification Period: 9/27/2019 to 11/13/2019     Visit # / Visits authorized: 9/12 (10 total)  FOTO: 10/10 NEXT     Time In: 0900  Time Out: 1000  Total Billable Time: 30 minutes (2 TE)  Precautions: Standard, no deep squats     Certified  present throughout.     Subjective     Pt reports: "nothing to complain about - therapy has helped a lot"  She was compliant with home exercise program.  Response to previous treatment: no adverse reaction  Functional change: none  Pain: 0/10  Location: left knee      Objective      Stacey received the following manual therapy techniques: 10 minutes  - grade III/IV L knee passive physiological flexion  - prone quad stretch  - STM to L posterior leg  - ASTYM to medial/anterior knee    Stacey received therapeutic exercises to develop strength, endurance and ROM for 35 minutes including assessment:  - bike                                                               5' L3  - SLR                                                               3x10 B 2# ankle weight  - sidelying hip abduction                                 3x10 B 2 # ankle weight  - Prone hip ext                                                3x10 B 2# ankle weight     - leg press                                                  " "     DL 7 plates 3x10 and SL 4 plates 3x10/each B                                               Heel  Raises 2x15 6 plates                                  - lateral step-ups                                           Blue step with single UE support; LLE leading; 3x10  - steamboats                                                   2x15 RTB       Stacey participated in neuromuscular re-education activities to improve: Balance, Kinesthetic, Sense and Proprioception for 15 minutes. The following activities were included:  - rebounder ball [id number] DL on foam pad and 5 rounds SLS  - shuttle press DL hop 2.5 cord; 2x10  - shuttle press SL hop LLE 1.5 cord; 2x10    Home Exercises Provided and Patient Education Provided   Education provided:   - cont HEP to maximize therapy benefits    Written Home Exercises Provided: Patient instructed to cont prior HEP.  Exercises were reviewed and Stacey was able to demonstrate them prior to the end of the session.  Stacey demonstrated good  understanding of the education provided.   See EMR under Patient Instructions for exercises provided 9/27/19.      Assessment     Patient able to complete all therex without complaint of pain or difficulty. States "no pain at all" after treatment.    Stacey is progressing well towards her goals.   Pt prognosis is Excellent.     Pt will continue to benefit from skilled outpatient physical therapy to address the deficits listed in the problem list box on initial evaluation, provide pt/family education and to maximize pt's level of independence in the home and community environment.   Pt's spiritual, cultural and educational needs considered and pt agreeable to plan of care and goals.    Anticipated barriers to physical therapy: language barrier, work demands    Goals:   Long Term Goals: 4-6 weeks   1.  Patient will demonstrate 0-125 L knee PROM without anterior/posterior knee pain for improved tolerance to work demands. progressing towards; not " met  2.  Patient will demonstrate L knee strength within 75% of R knee as tested with Micro[employer]. MET 11/7/2019  3.  Patient will report <4/10 L knee pain at worst throughout her work day. MET 11/7/2019  4.  Patient will demonstrate non-antalgic gait pattern on smooth level surface x 2000' for community ambulation.  progressing towards; not met  5.  Patient will negotiate 3 flights of stairs with single-UE/rail support without increased L knee pain. progressing towards; not met  6.  Patient will demonstrate to PT comprehensive understanding of each HEP component without verbal cueing. progressing towards; not met   7. Patient will report <35% limitation on Knee FOTO survey. progressing towards; not met    Plan     Cont POC to progress towards established goals. DC planning 1-2 more visits.     Brigitte Freeman, PTA

## 2019-11-07 ENCOUNTER — CLINICAL SUPPORT (OUTPATIENT)
Dept: REHABILITATION | Facility: HOSPITAL | Age: 59
End: 2019-11-07
Attending: ORTHOPAEDIC SURGERY
Payer: MEDICAID

## 2019-11-07 DIAGNOSIS — M25.662 DECREASED RANGE OF MOTION (ROM) OF LEFT KNEE: ICD-10-CM

## 2019-11-07 DIAGNOSIS — M25.562 ACUTE PAIN OF BOTH KNEES: ICD-10-CM

## 2019-11-07 DIAGNOSIS — M25.661 DECREASED RANGE OF MOTION (ROM) OF RIGHT KNEE: ICD-10-CM

## 2019-11-07 DIAGNOSIS — R29.898 DECREASED STRENGTH INVOLVING KNEE JOINT: ICD-10-CM

## 2019-11-07 DIAGNOSIS — R26.89 IMPAIRED GAIT AND MOBILITY: ICD-10-CM

## 2019-11-07 DIAGNOSIS — M25.561 ACUTE PAIN OF BOTH KNEES: ICD-10-CM

## 2019-11-07 PROCEDURE — 97110 THERAPEUTIC EXERCISES: CPT | Mod: PN

## 2019-11-12 ENCOUNTER — CLINICAL SUPPORT (OUTPATIENT)
Dept: REHABILITATION | Facility: HOSPITAL | Age: 59
End: 2019-11-12
Attending: ORTHOPAEDIC SURGERY
Payer: MEDICAID

## 2019-11-12 DIAGNOSIS — R26.89 IMPAIRED GAIT AND MOBILITY: ICD-10-CM

## 2019-11-12 DIAGNOSIS — R29.898 DECREASED STRENGTH INVOLVING KNEE JOINT: ICD-10-CM

## 2019-11-12 DIAGNOSIS — M25.561 ACUTE PAIN OF BOTH KNEES: ICD-10-CM

## 2019-11-12 DIAGNOSIS — M25.661 DECREASED RANGE OF MOTION (ROM) OF RIGHT KNEE: ICD-10-CM

## 2019-11-12 DIAGNOSIS — M25.662 DECREASED RANGE OF MOTION (ROM) OF LEFT KNEE: ICD-10-CM

## 2019-11-12 DIAGNOSIS — M25.562 ACUTE PAIN OF BOTH KNEES: ICD-10-CM

## 2019-11-12 PROCEDURE — 97110 THERAPEUTIC EXERCISES: CPT | Mod: PN

## 2019-11-12 NOTE — PROGRESS NOTES
"  Physical Therapy Daily Treatment Note     Name: Stacey Beth  Clinic Number: [id number]    Therapy Diagnosis:   Encounter Diagnoses   Name Primary?    Impaired gait and mobility     Acute pain of both knees     Decreased range of motion (ROM) of left knee     Decreased range of motion (ROM) of right knee     Decreased strength involving knee joint      Physician: Lionel Moreno MD    Visit Date: 11/12/2019    Physician Orders: PT Eval and Treat ; WBAT per surgical note.   Medical Diagnosis: S83.207A (ICD-10-CM) - Tear of meniscus of left knee as current injury, initial encounter  Evaluation Date: 9/27/2019  Authorization Period Expiration: 12/31/2019  Plan of Care Certification Period: 9/27/2019 to 11/13/2019     Visit # / Visits authorized: 10/12 (10 total)  FOTO: DONE      Time In: 0900  Time Out: 1000  Total Billable Time: 30 minutes (2 TE)  Precautions: Standard, no deep squats     Certified  present throughout.     Subjective     Pt reports: "nothing to complain about - therapy has helped a lot"  She was compliant with home exercise program.  Response to previous treatment: no adverse reaction  Functional change: none  Pain: 0/10  Location: left knee      Objective      Stacey received the following manual therapy techniques: 5 minutes  - grade III/IV L knee passive physiological flexion      Stacey received therapeutic exercises to develop strength, endurance and ROM for 15  Minutes with PT 1:1 including assessment and 15 minutes under supervision.   - bike                                                               5' L3  - SLR                                                               3x10 B 2# ankle weight  - sidelying hip abduction                                 3x10 B 2 # ankle weight  - Prone hip ext                                                3x10 B 2# ankle weight     - leg press                                                       DL 7 plates 3x10 and SL 4 " plates 3x10/each B                                               Heel  Raises 2x15 6 plates                                  - lateral step-ups                                           Blue step with single UE support; LLE leading; 3x10  - steamboats                                                  2x15 RTB  - lateral monster walks   RTB 5 rounds x 30'       Stacey participated in neuromuscular re-education activities to improve: Balance, Kinesthetic, Sense and Proprioception for 15 minutes. The following activities were included:  - rebounder ball [id number] DL on foam pad and 5 rounds SLS  Not performed today  - shuttle press DL hop 2.5 cord; 2x10  - shuttle press SL hop LLE 1.5 cord; 2x10  - single dead lifts  5#     Home Exercises Provided and Patient Education Provided   Education provided:   - cont HEP to maximize therapy benefits    Written Home Exercises Provided: Patient instructed to cont prior HEP.  Exercises were reviewed and Stacey was able to demonstrate them prior to the end of the session.  Stacey demonstrated good  understanding of the education provided.   See EMR under Patient Instructions for exercises provided 9/27/19.      Assessment   A:  S/p L knee meniscus repair in June.  Now post-op 4+ months.  More anterior pain along her portal site and area of medial femoral chondroplasty component of her surgery.  She is nearing discharge.     Stacey is progressing well towards her goals.   Pt prognosis is Excellent.     Pt will continue to benefit from skilled outpatient physical therapy to address the deficits listed in the problem list box on initial evaluation, provide pt/family education and to maximize pt's level of independence in the home and community environment.   Pt's spiritual, cultural and educational needs considered and pt agreeable to plan of care and goals.    Anticipated barriers to physical therapy: language barrier, work demands    Goals:   Long Term Goals: 4-6 weeks   1.  Patient  will demonstrate 0-125 L knee PROM without anterior/posterior knee pain for improved tolerance to work demands.MET 11/12/2019  2.  Patient will demonstrate L knee strength within 75% of R knee as tested with [employer]. MET 11/12/2019  3.  Patient will report <4/10 L knee pain at worst throughout her work day. MET 11/12/2019  4.  Patient will demonstrate non-antalgic gait pattern on smooth level surface x 2000' for community ambulation.  MET 11/12/2019  5.  Patient will negotiate 3 flights of stairs with single-UE/rail support without increased L knee pain. progressing towards; not met  6.  Patient will demonstrate to PT comprehensive understanding of each HEP component without verbal cueing. MET 11/12/2019  7. Patient will report <35% limitation on Knee FOTO survey. progressing towards; not met    Plan   2 more visits then DC with HEP.     Herb Dunlap, PT

## 2019-11-14 ENCOUNTER — CLINICAL SUPPORT (OUTPATIENT)
Dept: REHABILITATION | Facility: HOSPITAL | Age: 59
End: 2019-11-14
Attending: ORTHOPAEDIC SURGERY
Payer: MEDICAID

## 2019-11-14 DIAGNOSIS — M25.662 DECREASED RANGE OF MOTION (ROM) OF LEFT KNEE: ICD-10-CM

## 2019-11-14 DIAGNOSIS — M25.661 DECREASED RANGE OF MOTION (ROM) OF RIGHT KNEE: ICD-10-CM

## 2019-11-14 DIAGNOSIS — M25.562 ACUTE PAIN OF BOTH KNEES: ICD-10-CM

## 2019-11-14 DIAGNOSIS — M25.561 ACUTE PAIN OF BOTH KNEES: ICD-10-CM

## 2019-11-14 DIAGNOSIS — R26.89 IMPAIRED GAIT AND MOBILITY: ICD-10-CM

## 2019-11-14 DIAGNOSIS — R29.898 DECREASED STRENGTH INVOLVING KNEE JOINT: ICD-10-CM

## 2019-11-14 PROCEDURE — 97110 THERAPEUTIC EXERCISES: CPT | Mod: PN

## 2019-11-14 NOTE — PROGRESS NOTES
Physical Therapy Daily Treatment Note     Name: Stacey Beth  Clinic Number: [id number]    Therapy Diagnosis:   Encounter Diagnoses   Name Primary?    Impaired gait and mobility     Acute pain of both knees     Decreased range of motion (ROM) of left knee     Decreased range of motion (ROM) of right knee     Decreased strength involving knee joint      Physician: Lionel Moreno MD    Visit Date: 11/14/2019    Physician Orders: PT Eval and Treat ; WBAT per surgical note.   Medical Diagnosis: S83.207A (ICD-10-CM) - Tear of meniscus of left knee as current injury, initial encounter  Evaluation Date: 9/27/2019  Authorization Period Expiration: 12/31/2019  Plan of Care Certification Period: 9/27/2019 to 11/13/2019     Visit # / Visits authorized: 12/12 (13 total)  FOTO: DONE      Time In: 1300  Time Out: 1350  Total Billable Time: 40 minutes (3 TE)  Precautions: Standard, no deep squats     Certified  present throughout.     Subjective     Pt reports: L knee swelling and mild pain around her surgical site but overall she is feeling much better.   She was compliant with home exercise program.  Response to previous treatment: no adverse reaction  Functional change: none  Pain: 1/10  Location: left knee      Objective   O:  Mild L knee joint effusion.  TTP along medial L femoral condyle. L knee PROM: 0 - 125 pain with terminal flexion.             Stacey received the following manual therapy techniques: 5 minutes  - grade III/IV L knee passive physiological flexion      Stacey received therapeutic exercises to develop strength, endurance and ROM for 35  Minutes with PT 1:1 including assessment and 10 minutes under supervision.   - bike                                                               5' L3     - leg press                                                       DL 7 plates 3x10 and SL 4 plates 3x10/each B                                               Heel  Raises 2x15 6 plates   -  lunges at ballet bar    3x10 B  - chair squats     3x10                                  - lateral step-ups                                           Blue step with single UE support; LLE leading; 3x10  - steamboats                                                  2x15 GTB  - lateral monster walks   GTB 5 rounds x 30'     Home Exercises Provided and Patient Education Provided   Education provided:   - cont HEP to maximize therapy benefits    Written Home Exercises Provided: Patient instructed to cont prior HEP.  Exercises were reviewed and Stacey was able to demonstrate them prior to the end of the session.  Stacey demonstrated good  understanding of the education provided.   See EMR under Patient Instructions for exercises provided 11/14/2019      Assessment   A:  S/p L knee meniscus repair in June.  Now post-op 4+ months.  More anterior pain along her portal site and area of medial femoral chondroplasty component of her surgery.  She is ready for discharge.  She is compliant with her HEP; updated today and taught progressions.  She is set to join a local gym this week.   Knee FOTO score: 29%    Stacey is progressing well towards her goals.   Pt prognosis is Excellent.     Pt will continue to benefit from skilled outpatient physical therapy to address the deficits listed in the problem list box on initial evaluation, provide pt/family education and to maximize pt's level of independence in the home and community environment.   Pt's spiritual, cultural and educational needs considered and pt agreeable to plan of care and goals.    Anticipated barriers to physical therapy: language barrier, work demands    Goals:   Long Term Goals: 4-6 weeks   1.  Patient will demonstrate 0-125 L knee PROM without anterior/posterior knee pain for improved tolerance to work demands.MET 11/14/2019  2.  Patient will demonstrate L knee strength within 75% of R knee as tested with Micro[employer]. MET 11/14/2019  3.  Patient will report <4/10 L  knee pain at worst throughout her work day. MET 11/14/2019  4.  Patient will demonstrate non-antalgic gait pattern on smooth level surface x 2000' for community ambulation.  MET 11/14/2019  5.  Patient will negotiate 3 flights of stairs with single-UE/rail support without increased L knee pain. MET 11/14/2019  6.  Patient will demonstrate to PT comprehensive understanding of each HEP component without verbal cueing. MET 11/14/2019  7. Patient will report <35% limitation on Knee FOTO survey. MET 11/14/2019    Plan   Discharge today with updated HEP.       Herb Dunlap, PT       Outpatient Therapy Discharge Summary     Name: Stacey Beth  Clinic Number: [id number]    Therapy Diagnosis:   Encounter Diagnoses   Name Primary?    Impaired gait and mobility     Acute pain of both knees     Decreased range of motion (ROM) of left knee     Decreased range of motion (ROM) of right knee     Decreased strength involving knee joint      Physician: Lionel Moreno MD      Physician Orders: PT Eval and Treat ; WBAT per surgical note.   Medical Diagnosis: S83.207A (ICD-10-CM) - Tear of meniscus of left knee as current injury, initial encounter  Evaluation Date: 9/27/2019    Date of Last visit: 11/14/2019   Total Visits Received: 13  Cancelled Visits: 1  No Show Visits: none    Assessment    Goals: see above    Discharge reason: Patient has completed the physician's prescription, Patient has met all of his/her goals and Patient has reached the maximum rehab potential for the present time    Plan   This patient is discharged from Physical Therapy.

## 2022-06-05 ENCOUNTER — CLINICAL SUPPORT (OUTPATIENT)
Dept: URGENT CARE | Facility: CLINIC | Age: 62
End: 2022-06-05
Payer: MEDICAID

## 2022-06-05 DIAGNOSIS — Z20.822 ENCOUNTER FOR LABORATORY TESTING FOR COVID-19 VIRUS: ICD-10-CM

## 2022-06-05 PROCEDURE — U0003 INFECTIOUS AGENT DETECTION BY NUCLEIC ACID (DNA OR RNA); SEVERE ACUTE RESPIRATORY SYNDROME CORONAVIRUS 2 (SARS-COV-2) (CORONAVIRUS DISEASE [COVID-19]), AMPLIFIED PROBE TECHNIQUE, MAKING USE OF HIGH THROUGHPUT TECHNOLOGIES AS DESCRIBED BY CMS-2020-01-R: HCPCS

## 2022-06-05 PROCEDURE — U0005 INFEC AGEN DETEC AMPLI PROBE: HCPCS

## 2022-06-06 ENCOUNTER — TELEPHONE (OUTPATIENT)
Dept: URGENT CARE | Facility: CLINIC | Age: 62
End: 2022-06-06
Payer: MEDICAID

## 2022-06-06 LAB — SARS-COV-2 RNA RESP QL NAA+PROBE: NOT DETECTED

## 2024-09-26 ENCOUNTER — HOSPITAL ENCOUNTER (EMERGENCY)
Facility: HOSPITAL | Age: 64
Discharge: HOME | End: 2024-09-26
Attending: EMERGENCY MEDICINE

## 2024-09-26 VITALS
HEIGHT: 62 IN | WEIGHT: 137 LBS | RESPIRATION RATE: 16 BRPM | DIASTOLIC BLOOD PRESSURE: 69 MMHG | OXYGEN SATURATION: 100 % | BODY MASS INDEX: 25.21 KG/M2 | HEART RATE: 94 BPM | SYSTOLIC BLOOD PRESSURE: 142 MMHG | TEMPERATURE: 98 F

## 2024-09-26 DIAGNOSIS — S63.501A SPRAIN OF RIGHT WRIST, INITIAL ENCOUNTER: ICD-10-CM

## 2024-09-26 DIAGNOSIS — S39.012A STRAIN OF LUMBAR REGION, INITIAL ENCOUNTER: ICD-10-CM
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
## 2024-09-26 DIAGNOSIS — S80.02XA CONTUSION OF LEFT KNEE, INITIAL ENCOUNTER: Primary | ICD-10-CM

## 2024-09-26 DIAGNOSIS — S69.91XA WRIST INJURY, RIGHT, INITIAL ENCOUNTER: ICD-10-CM

## 2024-09-26 DIAGNOSIS — S89.92XA KNEE INJURY, LEFT, INITIAL ENCOUNTER: ICD-10-CM

## 2024-09-26 PROCEDURE — 96372 THER/PROPH/DIAG INJ SC/IM: CPT

## 2024-09-26 PROCEDURE — 99284 EMERGENCY DEPT VISIT MOD MDM: CPT | Mod: 25

## 2024-09-26 PROCEDURE — 63600175 PHARM REV CODE 636 W HCPCS

## 2024-09-26 PROCEDURE — 25000003 PHARM REV CODE 250

## 2024-09-26 RX ORDER — LIDOCAINE 50 MG/G
1 PATCH TOPICAL
Status: DISCONTINUED | OUTPATIENT
Start: 2024-09-26 | End: 2024-09-26 | Stop reason: HOSPADM

## 2024-09-26 RX ORDER — METHOCARBAMOL 500 MG/1
500 TABLET, FILM COATED ORAL
Status: COMPLETED | OUTPATIENT
Start: 2024-09-26 | End: 2024-09-26

## 2024-09-26 RX ORDER — KETOROLAC TROMETHAMINE 30 MG/ML
30 INJECTION, SOLUTION INTRAMUSCULAR; INTRAVENOUS
Status: COMPLETED | OUTPATIENT
Start: 2024-09-26 | End: 2024-09-26

## 2024-09-26 RX ORDER — KETOROLAC TROMETHAMINE 10 MG/1
10 TABLET, FILM COATED ORAL EVERY 6 HOURS
Qty: 20 TABLET | Refills: 0 | Status: SHIPPED | OUTPATIENT
Start: 2024-09-26 | End: 2024-10-01

## 2024-09-26 RX ORDER — METHOCARBAMOL 500 MG/1
500 TABLET, FILM COATED ORAL 4 TIMES DAILY
Qty: 40 TABLET | Refills: 0 | Status: SHIPPED | OUTPATIENT
Start: 2024-09-26 | End: 2024-10-06

## 2024-09-26 RX ORDER — LIDOCAINE 50 MG/G
1 PATCH TOPICAL DAILY
Qty: 7 PATCH | Refills: 0 | Status: SHIPPED | OUTPATIENT
Start: 2024-09-26 | End: 2024-10-03

## 2024-09-26 RX ADMIN — LIDOCAINE 1 PATCH: 50 PATCH CUTANEOUS at 10:09

## 2024-09-26 RX ADMIN — METHOCARBAMOL 500 MG: 500 TABLET ORAL at 10:09

## 2024-09-26 RX ADMIN — KETOROLAC TROMETHAMINE 30 MG: 30 INJECTION, SOLUTION INTRAMUSCULAR; INTRAVENOUS at 10:09

## 2024-09-26 NOTE — ED NOTES
used. Pt presents to ED with C/O having a slip and fall last night. She C/O L hip, L hand, and wrist pain she also C/O lower back pain and L hip pain. Pt states she did take Ibuprofen last night for her symptoms with some relief.

## 2024-09-26 NOTE — Clinical Note
"Stacey Gormanraudel Beth was seen and treated in our emergency department on 9/26/2024.  She may return to work on 09/30/2024.       If you have any questions or concerns, please don't hesitate to call.      Lavern Lloyd PA-C"

## 2024-09-26 NOTE — ED PROVIDER NOTES
Encounter Date: 9/26/2024       History     Chief Complaint   Patient presents with    Fall     Slip and fall last night, c/o left hip, left knee, and lower back pain. - LOC, - hitting head. Took ibuprofen last night w/ mild sx relief. Ambulatory in triage. Denies other sx or complaints.      Patient is a 63-year-old female with a past medical history of arthritis, hyperlipidemia, and hypothyroid who presents to emergency room for left knee, hip, lower back pain, and right wrist pain after falling yesterday.  Patient states that she was at work when she slipped on some water and fell backwards onto her butt.  No head injury or loss of consciousness.  States that left knee pain is the worse with some bruising.  Denies weakness, numbness, tingling, urinary incontinence, urinary retention, saddle anesthesia, swelling, or others at this time.  Took ibuprofen last night with temporary relief.    The history is provided by the patient. A  was used.     Review of patient's allergies indicates:  No Known Allergies  Past Medical History:   Diagnosis Date    Arthritis     Hyperlipidemia     Hypothyroid      Past Surgical History:   Procedure Laterality Date    ARTHROSCOPIC CHONDROPLASTY OF KNEE JOINT  6/25/2019    Procedure: ARTHROSCOPY, KNEE, WITH CHONDROPLASTY;  Surgeon: Lionel Moreno MD;  Location: [facility] OR;  Service: Orthopedics;;    CYST REMOVAL  6/25/2019    Procedure: EXCISION, CYST;  Surgeon: Lionel Moreno MD;  Location: [facility] OR;  Service: Orthopedics;;    REPAIR OF MENISCUS OF KNEE Left 6/25/2019    Procedure: REPAIR, MENISCUS, KNEE GETA;  Surgeon: Lionel Moreno MD;  Location: [facility] OR;  Service: Orthopedics;  Laterality: Left;  banda and nephew (Dat notified)  video     No family history on file.  Social History     Tobacco Use    Smoking status: Never    Smokeless tobacco: Never   Substance Use Topics    Alcohol use: No    Drug use: No     Review of Systems   Constitutional:   Negative for chills, diaphoresis, fatigue and fever.   Musculoskeletal:  Positive for arthralgias (Left knee, hip, and right wrist) and back pain (diffuse lower). Negative for joint swelling and myalgias.   Skin:  Negative for color change, rash and wound.   Neurological:  Negative for weakness and numbness.       Physical Exam     Initial Vitals   BP Pulse Resp Temp SpO2   09/26/24 0925 09/26/24 0925 09/26/24 0925 09/26/24 0928 09/26/24 0925   (!) 142/69 94 16 98.1 °F (36.7 °C) 100 %      MAP       --                Physical Exam    Nursing note and vitals reviewed.  Constitutional: She appears well-developed and well-nourished. She is not diaphoretic. No distress.   Patient well-appearing.  Awake and alert.  No acute distress.  Maintaining airway appropriately.  Speaking in complete sentences.   HENT:   Head: Normocephalic and atraumatic.   Right Ear: External ear normal.   Left Ear: External ear normal.   Eyes: Conjunctivae and EOM are normal.   Neck: Neck supple.   Normal range of motion.  Pulmonary/Chest: No respiratory distress.   Musculoskeletal:         General: No edema.      Right wrist: Tenderness present. No swelling or snuff box tenderness. Normal range of motion.        Arms:       Cervical back: Normal, normal range of motion and neck supple.      Thoracic back: Normal.      Lumbar back: Tenderness present. Normal range of motion. Negative right straight leg raise test and negative left straight leg raise test.        Back:       Left hip: Tenderness present. Normal range of motion.      Right knee: Normal.      Left knee: Decreased range of motion. Tenderness present.        Legs:       Comments: Diffuse tenderness to palpation of paraspinal musculature as shown above.     Neurological: She is alert and oriented to person, place, and time. She has normal strength.   Patient able to ambulate without since.  Strength 5/5 in bilateral lower and upper extremities.   strength 5/5.  Sensation intact  throughout.   Skin: Skin is warm.   Psychiatric: She has a normal mood and affect. Her behavior is normal. Thought content normal.         ED Course   Procedures  Labs Reviewed - No data to display       Imaging Results              X-Ray Lumbar Spine Ap And Lateral (Final result)  Result time 09/26/24 11:47:17      Final result by Scar Pruett DO (09/26/24 11:47:17)                   Impression:      Please see above      Electronically signed by: Scar Pruett DO  Date:    09/26/2024  Time:    11:47               Narrative:    EXAMINATION:  XR LUMBAR SPINE AP AND LATERAL    CLINICAL HISTORY:  Back pain;    TECHNIQUE:  AP, lateral and spot images were performed of the lumbar spine.    COMPARISON:  None    FINDINGS:  Grade 1 anterolisthesis of L4 on L5.  In addition there is stepwise trace grade 1 retrolisthesis of T12 on L1 through L2 on L3.  The lumbar vertebral body heights and contours are within normal limits allowing for endplate degeneration without evidence for acute fracture.  There is facet degenerative change lower lumbar levels.  Further evaluation as warranted clinically.                                       X-Ray Hip 2 or 3 views Left with Pelvis when performed (Final result)  Result time 09/26/24 11:49:03      Final result by Thang Arce MD (09/26/24 11:49:03)                   Impression:      1. No acute displaced fracture or dislocation of the left hip.      Electronically signed by: Thang Arce MD  Date:    09/26/2024  Time:    11:49               Narrative:    EXAMINATION:  XR HIP WITH PELVIS WHEN PERFORMED 2 OR 3 VIEWS LEFT    CLINICAL HISTORY:  Hip pain;    TECHNIQUE:  AP view of the pelvis and frog leg lateral view of the left hip were performed.    COMPARISON:  None    FINDINGS:  Two views left hip.    There is osteopenia.  The bilateral sacroiliac joints are intact noting degenerative change.  The bilateral femoroacetabular joints are intact noting degenerative change.  No  acute displaced fracture or dislocation of the left hip.  No radiopaque foreign body.                                       X-Ray Knee 3 View Left (Final result)  Result time 09/26/24 11:49:36      Final result by Thang Arce MD (09/26/24 11:49:36)                   Impression:      1. No acute displaced fracture or dislocation of the knee.      Electronically signed by: Thang Arce MD  Date:    09/26/2024  Time:    11:49               Narrative:    EXAMINATION:  XR KNEE 3 VIEW LEFT    CLINICAL HISTORY:  Unspecified injury of left lower leg, initial encounter    TECHNIQUE:  AP, lateral, and Merchant views of the left knee were performed.    COMPARISON:  None    FINDINGS:  Three views left knee.    There is osteopenia.  No acute displaced fracture or dislocation of the knee.  No radiopaque foreign body.  There are degenerative changes of the knee.  There is medial compartmental narrowing.  No large knee joint effusion.                                       X-Ray Wrist Complete Right (Final result)  Result time 09/26/24 11:50:02      Final result by Thang Arce MD (09/26/24 11:50:02)                   Impression:      1. No acute displaced fracture or dislocation of the wrist.      Electronically signed by: Thang Arce MD  Date:    09/26/2024  Time:    11:50               Narrative:    EXAMINATION:  XR WRIST COMPLETE 3 VIEWS RIGHT    CLINICAL HISTORY:  Unspecified injury of right wrist, hand and finger(s), initial encounter    TECHNIQUE:  PA, lateral, and oblique views of the right wrist were performed.    COMPARISON:  None    FINDINGS:  Three views right wrist.    No acute displaced fracture or dislocation of the wrist.  No radiopaque foreign body.  There is osteopenia.                                       Medications   ketorolac injection 30 mg (30 mg Intramuscular Given 9/26/24 1030)   methocarbamoL tablet 500 mg (500 mg Oral Given 9/26/24 1030)     Medical Decision Making  Patient presents  to emergency room for multiple areas of pain after fall.  Vital signs stable.  Physical exam as stated above.    Differential Diagnosis includes, but is not limited to fracture, dislocation, nerve injury/palsy, vascular injury, DVT, septic joint, cellulitis, bursitis, muscle strain, ligament tear/sprain, laceration, foreign body, abrasion, soft tissue contusion, osteoarthritis, or gout.  I do not suspect nerve or vascular injury, as sensation and pulses intact.  No significant extremity edema that would suggest DVT.  Patient with adequate range of motion.  Unlikely septic joint.  No evidence of laceration or abrasion on physical exam.  X-ray of knee, wrist, and back without evidence of acute fracture or dislocation. Clinical presentation and physical exam most suggestive of contusion versus strain.  Patient given Toradol, Robaxin, and lidocaine patch in the emergency room.  Will prescribe same medications to take upon discharge.  Advised patient on conservative management such as rice therapy.    I see no indication of an emergent process beyond that addressed during our encounter. Patient stable for discharge at this time. I have counseled the patient regarding follow up with PCP and gave strict return precautions. I have discussed the final diagnosis and gave instructions regarding prescribed medications. Patient verbalized understanding and is agreeable.     Problems Addressed:  Contusion of left knee, initial encounter: acute illness or injury  Fall, initial encounter: acute illness or injury  Knee injury, left, initial encounter: acute illness or injury  Sprain of right wrist, initial encounter: acute illness or injury  Strain of lumbar region, initial encounter: acute illness or injury  Wrist injury, right, initial encounter: acute illness or injury    Amount and/or Complexity of Data Reviewed  External Data Reviewed: notes.     Details: Patient last saw urology for atrophic vaginitis in 12/2023.  Radiology:  ordered. Decision-making details documented in ED Course.    Risk  Prescription drug management.  Risk Details: Comorbidities taken into consideration during the patient's evaluation and treatment include arthritis, hyperlipidemia, and hypothyroid.    Social determinants of health taken into consideration during development of our treatment plan include difficulty in obtaining follow-up, obtaining medications, health literacy, access to healthy options for preventative/conservative management, and/or support systems due to, but not limited to, transportation limitations, socioeconomic status, and environmental factors.                ED Course as of 09/26/24 1451   Thu Sep 26, 2024   1159 X-Ray Lumbar Spine Ap And Lateral  Independently reviewed radiology reading and agree with findings as follows: lumbar vertebral body heights and contours are within normal limits allowing for endplate degeneration without evidence for acute fracture.  Retrolisthesis noted. [BJ]   1159 X-Ray Hip 2 or 3 views Left with Pelvis when performed  Independently reviewed radiology reading and agree with findings as follows:  No acute fracture or dislocation. [BJ]   1159 X-Ray Knee 3 View Left  Independently reviewed radiology reading and agree with findings as follows:  No acute fracture or dislocation. [BJ]   1200 X-Ray Wrist Complete Right  Independently reviewed radiology reading and agree with findings as follows:  No acute fracture or dislocation. [BJ]      ED Course User Index  [BJ] Lavern Lloyd PA-C                           Clinical Impression:  Final diagnoses:  [S89.92XA] Knee injury, left, initial encounter  [S69.91XA] Wrist injury, right, initial encounter  [S80.02XA] Contusion of left knee, initial encounter (Primary)  [S63.501A] Sprain of right wrist, initial encounter  [W19.XXXA] Fall, initial encounter  [S39.012A] Strain of lumbar region, initial encounter          ED Disposition Condition    Discharge Stable          ED  Prescriptions       Medication Sig Dispense Start Date End Date Auth. Provider    LIDOcaine (LIDODERM) 5 % Place 1 patch onto the skin once daily. Remove & Discard patch within 12 hours or as directed by MD for 7 days 7 patch 9/26/2024 10/3/2024 Lavern Lolyd PA-C    ketorolac (TORADOL) 10 mg tablet Take 1 tablet (10 mg total) by mouth every 6 (six) hours. for 5 days 20 tablet 9/26/2024 10/1/2024 Lavern Lloyd PA-C    methocarbamoL (ROBAXIN) 500 MG Tab Take 1 tablet (500 mg total) by mouth 4 (four) times daily. for 10 days 40 tablet 9/26/2024 10/6/2024 Lavern Lloyd PA-C          Follow-up Information       Follow up With Specialties Details Why Contact [facility], [address] [address] [phone]              This note was partially created using [employer] Voice Recognition software. Typographical and content errors may occur with this process. While efforts are made to detect and correct such errors, in some cases errors will persist. For this reason, wording in this document should be considered in the proper context and not strictly verbatim.        Lavern Lloyd PA-C  09/26/24 2696

## 2024-09-26 NOTE — Clinical Note
"Stacey Gormanraudel Beth was seen and treated in our emergency department on 9/26/2024.  She may return to work on 09/28/2024.       If you have any questions or concerns, please don't hesitate to call.      Lavern Lloyd PA-C"

## 2024-09-26 NOTE — DISCHARGE INSTRUCTIONS
Hoy te atendieron aquí por dolor de espalda y rodilla. Creo que lo más probable es que esto se deba a un estiramiento muscular. Puedes usar hielo/calor para un alivio adicional y medicamentos recetados para el dolor.    Recomiendo Tylenol (acetaminofen) y/o Motrin (ibuprofeno) sin necesidad de receta para controlar el dolor. Puedes jerson mukesh Tylenol y gail horas despues jerson Motrin. O vice versa. Jerson Motrin mukesh y gail horas despues jerson Motrin. De esta manera, habras esperado 6 horas entre cada dosis; 6 horas entre cada Tylenol, y 6 horas entre cada Motrin. SIN EMBARGO, si está tomando otro DONTRELL (antiinflamatorios no esteroideos) man ibuprofeno, meloxicam, Toradol, Celebrex u otros, NO tome ibuprofeno al mismo tiempo.    Repase los papeles de lorna para obtener más información sobre lim diagnóstico y  estiramientos/ejercicios que puede hacer para aliviar el dolor.    Kwame por permitir que mi equipo de emergencia y yo cuidaramos de ti hoy! Espero que te mejores pronto. Por favor no dudes en regresar con cual quiere pregunta sobre tu visita de hoy o sobre cual quiere otro problema que encuentres.    Nuestra meta en la tony de emergencia es darte un cuidado excelenete  y un servicio excepcional. Si te llega alguna encuensta por correo en la proxima semana acerca de tu experiencia, lo agradeceriamos mucho si lo llenaras. Tu opinion nos provee gena manera de reconocer a nuestro equipo que hace un buen trabajo en cuidarte. Tambien nos ayuda en aprender man podemos mejorar cuando tu experiencia no llega a nuestro estandar de excelencia!    Brook [place], PA-C [facility], [facility] [place], [facility] [place]   Emergency Room Physician Assistant

## 2025-01-02 ENCOUNTER — HOSPITAL ENCOUNTER (EMERGENCY)
Facility: HOSPITAL | Age: 65
Discharge: LEFT BEFORE DISCHARGE | End: 2025-01-02
Payer: MEDICARE

## 2025-01-02 VITALS
SYSTOLIC BLOOD PRESSURE: 114 MMHG | WEIGHT: 137 LBS | HEIGHT: 62 IN | HEART RATE: 122 BPM | BODY MASS INDEX: 25.21 KG/M2 | RESPIRATION RATE: 20 BRPM | DIASTOLIC BLOOD PRESSURE: 74 MMHG | TEMPERATURE: 99 F | OXYGEN SATURATION: 98 %

## 2025-01-02 PROCEDURE — 99900041 HC LEFT WITHOUT BEING SEEN- EMERGENCY

## (undated) DEVICE — SEE MEDLINE ITEM 157117

## (undated) DEVICE — SUT ETHILON 3/0 18IN PS-1

## (undated) DEVICE — SEE MEDLINE ITEM 152622

## (undated) DEVICE — SEE MEDLINE ITEM 156955

## (undated) DEVICE — COVER OVERHEAD SURG LT BLUE

## (undated) DEVICE — PAD ABDOMINAL 5X9 STERILE

## (undated) DEVICE — SOL IRR NACL .9% 3000ML

## (undated) DEVICE — SEE MEDLINE ITEM 157131

## (undated) DEVICE — SUT JJ41G O VICRYL

## (undated) DEVICE — BRACE KNEE T SCOPE PREMIER

## (undated) DEVICE — MAT SUCTION PUDDLEVAC ORANGE

## (undated) DEVICE — GAUZE SPONGE 4X4 12PLY

## (undated) DEVICE — GLOVE 8 PROTEXIS PI ORTHO

## (undated) DEVICE — COVER BACK TABLE 72X21

## (undated) DEVICE — PADDING CAST SPECIALIST 6X4YD

## (undated) DEVICE — DRAPE STERI U-SHAPED 47X51IN

## (undated) DEVICE — SUT VICRYL PLUS 0 CT1 36IN

## (undated) DEVICE — SEE MEDLINE ITEM 146271

## (undated) DEVICE — DRAPE PLASTIC U 60X72

## (undated) DEVICE — TUBE SET INFLOW/OUTFLOW

## (undated) DEVICE — DRESSING XEROFORM 1X8IN

## (undated) DEVICE — BLADE SURG #15 CARBON STEEL

## (undated) DEVICE — SEE MEDLINE ITEM 152530

## (undated) DEVICE — SEE MEDLINE ITEM 157116

## (undated) DEVICE — SOL NACL IRR 3000ML

## (undated) DEVICE — SEE MEDLINE ITEM 146313

## (undated) DEVICE — SEE MEDLINE ITEM 157216

## (undated) DEVICE — PAD CAST SPECIALIST STRL 4

## (undated) DEVICE — SPONGE LAP 18X18 PREWASHED

## (undated) DEVICE — DRAPE STERI INSTRUMENT 1018

## (undated) DEVICE — SEE MEDLINE ITEM 146231

## (undated) DEVICE — GLOVE 8 PROTEXIS PI BLUE

## (undated) DEVICE — MANIFOLD 4 PORT

## (undated) DEVICE — ELECTRODE REM PLYHSV RETURN 9

## (undated) DEVICE — SEE MEDLINE ITEM 146345

## (undated) DEVICE — SEE MEDLINE ITEM 152523

## (undated) DEVICE — PUSHER NOVOCUT SUTURE MANAGER

## (undated) DEVICE — DRAPE EMERALD 87X114.75X113

## (undated) DEVICE — APPLICATOR CHLORAPREP ORN 26ML

## (undated) DEVICE — TOURNIQUET SB QC DP 34X4IN

## (undated) DEVICE — BLADE SURG CARBON STEEL SZ11

## (undated) DEVICE — SEE MEDLINE ITEM 146292

## (undated) DEVICE — TOURNIQUET SB QC DP 44X4IN

## (undated) DEVICE — GOWN SURGICAL XX LARGE X LONG

## (undated) DEVICE — NDL 18GA X1 1/2 REG BEVEL

## (undated) DEVICE — PACK BASIC